# Patient Record
Sex: FEMALE | Race: WHITE | NOT HISPANIC OR LATINO | Employment: OTHER | URBAN - METROPOLITAN AREA
[De-identification: names, ages, dates, MRNs, and addresses within clinical notes are randomized per-mention and may not be internally consistent; named-entity substitution may affect disease eponyms.]

---

## 2017-08-02 RX ORDER — METFORMIN HYDROCHLORIDE 500 MG/5ML
SOLUTION ORAL
COMMUNITY
End: 2017-09-21

## 2017-08-02 RX ORDER — ESOMEPRAZOLE MAGNESIUM 40 MG/1
40 GRANULE, DELAYED RELEASE ORAL
Qty: 30 EACH | Refills: 11 | Status: SHIPPED | OUTPATIENT
Start: 2017-08-02 | End: 2017-09-21

## 2017-08-02 RX ORDER — METFORMIN HYDROCHLORIDE 500 MG/5ML
500 SOLUTION ORAL DAILY
Status: CANCELLED | OUTPATIENT
Start: 2017-08-02 | End: 2018-08-02

## 2017-08-02 RX ORDER — PANTOPRAZOLE SODIUM 40 MG/1
40 FOR SUSPENSION ORAL DAILY
Qty: 30 PACKET | Refills: 11 | Status: CANCELLED | OUTPATIENT
Start: 2017-08-02 | End: 2018-08-02

## 2017-08-02 NOTE — TELEPHONE ENCOUNTER
[facility] with concerned care reports patient has peg tube is on Metformin and Protonix and is requiring capable medication because Metformin and Protonix can not be crushed.

## 2017-08-02 NOTE — TELEPHONE ENCOUNTER
I got this message several weeks ago. I sent Rx to [facility] in [place] on 7/17 for Protonix granule packets to be given via PEG tube. Patient was notified of this on 7/19, and she was told it was OK to stay off metformin for now.

## 2017-08-02 NOTE — TELEPHONE ENCOUNTER
[facility] states Protonix does not come in a powder granule form.  Nexium 40 mg. does come in #30 packets per box.  Can you please send it to [facility]  I will need to get a PA from .

## 2017-08-02 NOTE — TELEPHONE ENCOUNTER
----- Message from Bruno Danielle sent at 8/2/2017  9:49 AM CDT -----  Contact: [facility]  G[facility]iota called in regarding the attached patient and stated they have been trying to get a refill on patient medication for 5 weeks now and have gotten no response.  Patient has had a peg tube since March and has medications that cannot be crushed.  Pharmacy/[facility] has also sent request over with no response.    1.  Metformin 500 mg (capadable for for a peg tube)  2.  Protonix 40 mg (capadable for ped tube)    [facility]'s call back number is [phone]

## 2017-08-04 ENCOUNTER — TELEPHONE (OUTPATIENT)
Dept: PRIMARY CARE CLINIC | Facility: CLINIC | Age: 67
End: 2017-08-04

## 2017-08-04 NOTE — TELEPHONE ENCOUNTER
----- Message from Chioma Merrill sent at 8/1/2017  2:49 PM CDT -----  Please call patient in regards to metformin not going through feeding tube, [phone] (home)

## 2017-08-04 NOTE — TELEPHONE ENCOUNTER
Spoke to patient.  Informed her she does not need to be taking Metformin.  Also PH denied the Nexium packets.  They state she can open the esomeprazole capsules and administer through PEG tube.  Patient states she has 3 bottles of the Esomeprazole capsules.  Left detailed voicemail for [facility] nurse Re: this matter.  Scheduled patient for appt to see Dr. Gallego on 8/24/17 @ 3:45

## 2017-08-04 NOTE — TELEPHONE ENCOUNTER
----- Message from Mireya Tate sent at 8/4/2017 12:06 PM CDT -----  Please call patient in reference to a medication question.  Call [phone].

## 2017-09-08 PROBLEM — C15.9 ESOPHAGEAL CANCER: Status: ACTIVE | Noted: 2017-09-08

## 2017-09-08 PROBLEM — G47.00 INSOMNIA: Status: ACTIVE | Noted: 2017-09-08

## 2017-09-08 PROBLEM — E11.69 TYPE 2 DIABETES MELLITUS WITH HYPERLIPIDEMIA: Status: ACTIVE | Noted: 2017-09-08

## 2017-09-08 PROBLEM — E78.5 HYPERLIPIDEMIA: Status: ACTIVE | Noted: 2017-09-08

## 2017-09-08 PROBLEM — E78.5 TYPE 2 DIABETES MELLITUS WITH HYPERLIPIDEMIA: Status: ACTIVE | Noted: 2017-09-08

## 2017-09-08 PROBLEM — I10 ESSENTIAL HYPERTENSION, BENIGN: Status: ACTIVE | Noted: 2017-09-08

## 2017-09-08 PROBLEM — K21.9 GERD (GASTROESOPHAGEAL REFLUX DISEASE): Status: ACTIVE | Noted: 2017-09-08

## 2017-09-21 ENCOUNTER — OFFICE VISIT (OUTPATIENT)
Dept: HEMATOLOGY/ONCOLOGY | Facility: CLINIC | Age: 67
End: 2017-09-21
Payer: MEDICARE

## 2017-09-21 VITALS
BODY MASS INDEX: 21.64 KG/M2 | RESPIRATION RATE: 18 BRPM | SYSTOLIC BLOOD PRESSURE: 108 MMHG | HEART RATE: 73 BPM | HEIGHT: 57 IN | TEMPERATURE: 99 F | DIASTOLIC BLOOD PRESSURE: 65 MMHG | WEIGHT: 100.31 LBS

## 2017-09-21 DIAGNOSIS — C78.00 MALIGNANT NEOPLASM METASTATIC TO LUNG, UNSPECIFIED LATERALITY: ICD-10-CM

## 2017-09-21 DIAGNOSIS — C15.9 MALIGNANT NEOPLASM OF ESOPHAGUS, UNSPECIFIED LOCATION: Primary | ICD-10-CM

## 2017-09-21 PROCEDURE — 1126F AMNT PAIN NOTED NONE PRSNT: CPT | Mod: ,,, | Performed by: INTERNAL MEDICINE

## 2017-09-21 PROCEDURE — 3008F BODY MASS INDEX DOCD: CPT | Mod: ,,, | Performed by: INTERNAL MEDICINE

## 2017-09-21 PROCEDURE — 99204 OFFICE O/P NEW MOD 45 MIN: CPT | Mod: ,,, | Performed by: INTERNAL MEDICINE

## 2017-09-21 PROCEDURE — 1159F MED LIST DOCD IN RCRD: CPT | Mod: ,,, | Performed by: INTERNAL MEDICINE

## 2017-09-21 RX ORDER — EZETIMIBE 10 MG/1
10 TABLET ORAL DAILY
COMMUNITY
End: 2018-01-10 | Stop reason: SDUPTHER

## 2017-09-21 RX ORDER — LISINOPRIL AND HYDROCHLOROTHIAZIDE 20; 25 MG/1; MG/1
1 TABLET ORAL DAILY
COMMUNITY
End: 2017-10-23 | Stop reason: SDUPTHER

## 2017-09-21 RX ORDER — HYDROCODONE BITARTRATE AND ACETAMINOPHEN 7.5; 325 MG/15ML; MG/15ML
10 SOLUTION ORAL EVERY 4 HOURS PRN
COMMUNITY
End: 2017-09-21 | Stop reason: SDUPTHER

## 2017-09-21 RX ORDER — OMEPRAZOLE 40 MG/1
40 CAPSULE, DELAYED RELEASE ORAL DAILY
COMMUNITY
End: 2018-08-27

## 2017-09-21 RX ORDER — HYDROCODONE BITARTRATE AND ACETAMINOPHEN 7.5; 325 MG/15ML; MG/15ML
10 SOLUTION ORAL EVERY 4 HOURS PRN
Qty: 473 ML | Refills: 0 | Status: SHIPPED | OUTPATIENT
Start: 2017-09-21 | End: 2018-02-19

## 2017-09-21 NOTE — PROGRESS NOTES
[facility] Hematolgy/Oncology  History & Physical    Subjective:      Patient ID:   NAME: Arina Mcginnis : 1950     67 y.o. female    Referring Doc: Galo Winters  Other Physicians: Patt Gallego Veith        Chief Complaint:   Esophageal cancer    HPI:  67 y.o. female with diagnosis of advanced metastatic esophageal cnacer who has been referred by Dr Galo Winters for evaluation by medical hematology/oncology. She was originally diagnosed in 2017 and was previously seen by Dr Phillip Lazo with oncology at [facility]. She had monotherapy with radiation from  through May 23rd. She had a surveillance EGD on  with repeat esophageal biopsy showing residual presence of invasive squamous cell carcinoma. She is here with his sister. She saw Dr Lazo about 3 weeks ago. She has portacath in place and has peg tube. She is unable to swallow. She has lost some weight 20-25 lbs but is now back up to 100lbs. She denies any CP, SOB, HA's or N/V. She is a poor historian. She has some upper mid-back pain.               ROS:   GEN: normal without any fever, night sweats; positive weigh loss  HEENT: normal with no HA's, sore throat, stiff neck, changes in vision  CV: normal with no CP, SOB, PND, ARGUETA or orthopnea  PULM: normal with no SOB, cough, hemoptysis, sputum or pleuritic pain  GI: unable to swallow; peg tube  : normal with no hematuria, dysuria  BREAST: normal with no mass, discharge, pain  SKIN: normal with no rash, erythema, bruising, or swelling       Past Medical/Surgical History:      Allergies:  Review of patient's allergies indicates:  Allergies not on file    Social/Family History:  Social History     Social History    Marital status: Single     Spouse name: N/A    Number of children: N/A    Years of education: N/A     Occupational History    Not on file.     Social History Main Topics    Smoking status: Former Smoker     Packs/day: 1.00     Years: 48.00     Types: Cigarettes     Quit date:  "07/2017    Smokeless tobacco: Never Used    Alcohol use Not on file    Drug use: Unknown    Sexual activity: Not on file     Other Topics Concern    Not on file     Social History Narrative    No narrative on file     No family history on file.      Medications:    Current Outpatient Prescriptions:     ezetimibe (ZETIA) 10 mg tablet, Take 10 mg by mouth once daily., Disp: , Rfl:     hydrocodone-acetaminophen (HYCET) solution 7.5-325 mg/15mL, Take 10 mLs by mouth every 4 (four) hours as needed for Pain., Disp: , Rfl:     lisinopril-hydrochlorothiazide (PRINZIDE,ZESTORETIC) 20-25 mg Tab, Take 1 tablet by mouth once daily., Disp: , Rfl:     omeprazole (PRILOSEC) 40 MG capsule, Take 40 mg by mouth once daily., Disp: , Rfl:       Pathology:  3/29/2017  Esophageal Biopsy: upper esophagus with invasive moderately differentiated squamous cell carcinoma  PDL-1 (keytruda) positive at 5% (weak)  HER2Nu +2 equivocal  Stage IV  T3 N1 M1    3/27/2017 cervical LN biopsy - positive for metastatic squamous cell carcinoma    Objective:   Vitals:  Blood pressure 108/65, pulse 73, temperature 98.5 °F (36.9 °C), temperature source Oral, resp. rate 18, height 4' 9" (1.448 m), weight 45.5 kg (100 lb 4.8 oz).    Physical Examination:   GEN: no apparent distress, comfortable; AAOx3; looks older than her age  HEAD: atraumatic and normocephalic  EYES: no pallor, no icterus, PERRLA  ENT: OMM, no pharyngeal erythema, external ears WNL; no nasal discharge; no thrush  NECK: no masses, thyroid normal, trachea midline, no LAD/LN's, supple  CV: RRR with no murmur; normal pulse; normal S1 and S2; no pedal edema  CHEST: Normal respiratory effort; CTAB; normal breath sounds; no wheeze or crackles; portacath on left CW  ABDOM: nontender and nondistended; soft; normal bowel sounds; no rebound/guarding; peg tube  MUSC/Skeletal: ROM normal; no crepitus; joints normal; no deformities or arthropathy  EXTREM: no clubbing, cyanosis, inflammation or " swelling  SKIN: no rashes, lesions, ulcers, petechiae or subcutaneous nodules  : no ruiz  NEURO: grossly intact; motor/sensory WNL; AAOx3; no tremors  PSYCH: normal mood, affect and behavior  LYMPH: normal cervical, supraclavicular, axillary and groin LN's      Labs:     Need latest labs from [facility]    Radiology/Diagnostic Studies:    CT Chest 7/14/2017 at [facility] - decreased size of the esophageal mass and resolution of the right paraesophageal LN, but worsening of the pulmonary mets (per report)      All lab results and imaging results have been reviewed and discussed with the patient    Assessment:   (1) 67 y.o. female with diagnosis of advanced metastatic esophageal cancer who has been referred by Dr Galo Winters for evaluation by medical hematology/oncology.  - originally diagnosed in March 2017  - squamous cell variety per pathology report dated 3/29/2017  - positive right supraclavicular LN  - she had port placement on   - she was seen in past by Dr Phillip Lazo with oncology at [facility] and Dr Jonny Carrion with Rad/onc at [facility]  - she completed XRT in May 23rd 2017    - she had repeat esophageal biopsy on 9/1/2017 which showed residual squamous cell carcinoma    (2) HTN and Hypercholesterolemia    (3) DM II    (4) GERD    (5) Hepatitis C history with cirrhosis - she is followed by Dr Beltre; s/p successful Harvoni therapy    (6) Arthrits issues    (7) former smoker - she quit one month ago    I discussed the poor prognosis of having this type of cancer and the incurability of her condition. She is interested in pursuing some form of therapy but the limiting factor will be the liver function with her diagnosis of cirrhosis and chemotherapy toxicity.     I reviewed and discussed the pathology report(s) in depth with the patient and went over the patient's individual diagnosis based on the information that was currently available. I discussed the TNM staging process with regard to the patient's particular cancer  type, and the calculated stage based on the currently available TNM data. I discussed the available prognostic data with regard to the current staging information and how it relates to the prognosis of their particular neoplastic process.              Plan:         PLAN:  1. Schedule PET scan  2. Need all records from Dr Lazo  3. Need latest labs from [facility]  4. Fax note to Patt Gallego Contreary  RTC in  1 week      I have explained and the patient understands all of  the current recommendation(s). I have answered all of their questions to the best of my ability and to their complete satisfaction.             Thank you for allowing me to participate in this patient's care. Please call with any questions or concerns.    Electronically signed Alfred Petit MD

## 2017-09-25 LAB — GLUCOSE SERPL-MCNC: 124 MG/DL (ref 70–99)

## 2017-09-29 ENCOUNTER — OFFICE VISIT (OUTPATIENT)
Dept: HEMATOLOGY/ONCOLOGY | Facility: CLINIC | Age: 67
End: 2017-09-29
Payer: MEDICARE

## 2017-09-29 VITALS
HEART RATE: 79 BPM | RESPIRATION RATE: 18 BRPM | WEIGHT: 102.69 LBS | HEIGHT: 55 IN | SYSTOLIC BLOOD PRESSURE: 95 MMHG | DIASTOLIC BLOOD PRESSURE: 59 MMHG | BODY MASS INDEX: 23.77 KG/M2 | TEMPERATURE: 99 F

## 2017-09-29 DIAGNOSIS — C15.9 MALIGNANT NEOPLASM OF ESOPHAGUS, UNSPECIFIED LOCATION: ICD-10-CM

## 2017-09-29 DIAGNOSIS — C78.00 MALIGNANT NEOPLASM METASTATIC TO LUNG, UNSPECIFIED LATERALITY: Primary | ICD-10-CM

## 2017-09-29 LAB
ALBUMIN SERPL-MCNC: 3.5 G/DL (ref 3.1–4.7)
ALP SERPL-CCNC: 145 IU/L (ref 40–104)
ALT (SGPT): 15 IU/L (ref 3–33)
AST SERPL-CCNC: 22 IU/L (ref 10–40)
BASOPHILS NFR BLD: 0 K/UL (ref 0–0.2)
BASOPHILS NFR BLD: 0.4 %
BILIRUB SERPL-MCNC: 0.3 MG/DL (ref 0.3–1)
BUN SERPL-MCNC: 33 MG/DL (ref 8–20)
CALCIUM SERPL-MCNC: 9.1 MG/DL (ref 7.7–10.4)
CHLORIDE: 95 MMOL/L (ref 98–110)
CO2 SERPL-SCNC: 27 MMOL/L (ref 22.8–31.6)
CREATININE: 0.59 MG/DL (ref 0.6–1.4)
EOSINOPHIL NFR BLD: 0.2 K/UL (ref 0–0.7)
EOSINOPHIL NFR BLD: 2.1 %
ERYTHROCYTE [DISTWIDTH] IN BLOOD BY AUTOMATED COUNT: 14.2 % (ref 11.7–14.9)
GLUCOSE: 129 MG/DL (ref 70–99)
GRAN #: 5.7 K/UL (ref 1.4–6.5)
GRAN%: 73.3 %
HCT VFR BLD AUTO: 24.6 % (ref 36–48)
HGB BLD-MCNC: 8.3 G/DL (ref 12–15)
IMMATURE GRANS (ABS): 0 K/UL (ref 0–1)
IMMATURE GRANULOCYTES: 0.3 %
LYMPH #: 0.7 K/UL (ref 1.2–3.4)
LYMPH%: 9.3 %
MCH RBC QN AUTO: 33.7 PG (ref 25–35)
MCHC RBC AUTO-ENTMCNC: 33.7 G/DL (ref 31–36)
MCV RBC AUTO: 100 FL (ref 79–98)
MONO #: 1.1 K/UL (ref 0.1–0.6)
MONO%: 14.6 %
NUCLEATED RBCS: 0 %
PLATELET # BLD AUTO: 151 K/UL (ref 140–440)
PMV BLD AUTO: 10.7 FL (ref 8.8–12.7)
POTASSIUM SERPL-SCNC: 4.4 MMOL/L (ref 3.5–5)
PROT SERPL-MCNC: 7.8 G/DL (ref 6–8.2)
RBC # BLD AUTO: 2.46 M/UL (ref 3.5–5.5)
SODIUM: 130 MMOL/L (ref 134–144)
WBC # BLD AUTO: 7.7 K/UL (ref 5–10)

## 2017-09-29 PROCEDURE — 1126F AMNT PAIN NOTED NONE PRSNT: CPT | Mod: ,,, | Performed by: INTERNAL MEDICINE

## 2017-09-29 PROCEDURE — 3008F BODY MASS INDEX DOCD: CPT | Mod: ,,, | Performed by: INTERNAL MEDICINE

## 2017-09-29 PROCEDURE — 99214 OFFICE O/P EST MOD 30 MIN: CPT | Mod: ,,, | Performed by: INTERNAL MEDICINE

## 2017-09-29 PROCEDURE — 1159F MED LIST DOCD IN RCRD: CPT | Mod: ,,, | Performed by: INTERNAL MEDICINE

## 2017-09-29 RX ORDER — ESOMEPRAZOLE MAGNESIUM 40 MG/1
CAPSULE, DELAYED RELEASE ORAL
Refills: 1 | COMMUNITY
Start: 2017-08-11 | End: 2018-01-03 | Stop reason: SDUPTHER

## 2017-09-29 RX ORDER — MUPIROCIN 20 MG/G
OINTMENT TOPICAL
Refills: 0 | COMMUNITY
Start: 2017-09-06 | End: 2018-08-27

## 2017-09-29 RX ORDER — SULFAMETHOXAZOLE AND TRIMETHOPRIM 200; 40 MG/5ML; MG/5ML
SUSPENSION ORAL
Refills: 0 | COMMUNITY
Start: 2017-09-06 | End: 2018-02-19

## 2017-09-29 NOTE — PROGRESS NOTES
[facility] Hematology/Oncology  PROGRESS NOTE      Subjective:       Patient ID:   NAME: Arina Mcginnis : 1950     67 y.o. female    Referring Doc: Paul Gallego MD  Other Physicians: Clifton Winters Beary    Chief Complaint:  Follow-up and Results (waiting on the pet)      History of Present Illness:     Patient returns today for a regularly scheduled follow-up visit.  The patient had her PET scan the other day but the report is not yet available pending comparison to outside films. She is here with her sister. I discussed with her about referral to Dr Loredo for palliative esophageal stent and she is agreeable. She does have a feeding tube in place.             ROS:   GEN: normal without any fever, night sweats or weight loss  HEENT: normal with no HA's, sore throat, stiff neck, changes in vision  CV: normal with no CP, SOB, PND, ARGUETA or orthopnea  PULM: normal with no SOB, cough, hemoptysis, sputum or pleuritic pain  GI: normal with no abdominal pain, nausea, vomiting, constipation, diarrhea, melanotic stools, BRBPR, or hematemesis  : normal with no hematuria, dysuria  BREAST: normal with no mass, discharge, pain  SKIN: normal with no rash, erythema, bruising, or swelling    Allergies:  Review of patient's allergies indicates:  No Known Allergies    Medications:    Current Outpatient Prescriptions:     ezetimibe (ZETIA) 10 mg tablet, Take 10 mg by mouth once daily., Disp: , Rfl:     hydrocodone-acetaminophen (HYCET) solution 7.5-325 mg/15mL, Take 10 mLs by mouth every 4 (four) hours as needed for Pain., Disp: 473 mL, Rfl: 0    lisinopril-hydrochlorothiazide (PRINZIDE,ZESTORETIC) 20-25 mg Tab, Take 1 tablet by mouth once daily., Disp: , Rfl:     omeprazole (PRILOSEC) 40 MG capsule, Take 40 mg by mouth once daily., Disp: , Rfl:     esomeprazole (NEXIUM) 40 MG capsule, TK 1 C PO QD, Disp: , Rfl: 1    mupirocin (BACTROBAN) 2 % ointment, TERRENCE TOPICALLY TID FOR 7 DAYS, Disp: , Rfl: 0     "sulfamethoxazole-trimethoprim 200-40 mg/5 ml (BACTRIM,SEPTRA) 200-40 mg/5 mL Susp, TK 7.5 ML PER ENTERAL TUBE BID FOR 10 DAYS, Disp: , Rfl: 0    PMHx/PSHx Updates:  See patient's last visit with me on 9/21/2017.  See H&P on 9/21/2017        Pathology:  3/29/2017 on chart:    Esophageal Biopsy: upper esophagus with invasive moderately differentiated squamous cell carcinoma  PDL-1 (keytruda) positive at 5% (weak)  HER2Nu +2 equivocal  Stage IV  T3 N1 M1     3/27/2017 cervical LN biopsy - positive for metastatic squamous cell carcinoma      Objective:     Vitals:  Blood pressure (!) 95/59, pulse 79, temperature 98.5 °F (36.9 °C), resp. rate 18, height 4' 2" (1.27 m), weight 46.6 kg (102 lb 11.2 oz).    Physical Examination:   GEN: no apparent distress, comfortable; AAOx3  HEAD: atraumatic and normocephalic  EYES: no pallor, no icterus, PERRLA  ENT: OMM, no pharyngeal erythema, external ears WNL; no nasal discharge; no thrush  NECK: no masses, thyroid normal, trachea midline, no LAD/LN's, supple  CV: RRR with no murmur; normal pulse; normal S1 and S2; no pedal edema  CHEST: Normal respiratory effort; CTAB; normal breath sounds; no wheeze or crackles  ABDOM: nontender and nondistended; soft; normal bowel sounds; no rebound/guarding; peg tube in place  MUSC/Skeletal: ROM normal; no crepitus; joints normal; no deformities or arthropathy  EXTREM: no clubbing, cyanosis, inflammation or swelling  SKIN: no rashes, lesions, ulcers, petechiae or subcutaneous nodules  : no ruiz  NEURO: grossly intact; motor/sensory WNL; AAOx3; no tremors  PSYCH: normal mood, affect and behavior  LYMPH: normal cervical, supraclavicular, axillary and groin LN's            Labs:     None       Radiology/Diagnostic Studies:    No results found.    I have reviewed all available lab results and radiology reports.    Assessment/Plan:   (1) 67 y.o. female with diagnosis of advanced metastatic esophageal cancer who has been referred by Dr Rosenthal " Singh for evaluation by medical hematology/oncology.  - originally diagnosed in March 2017  - squamous cell variety per pathology report dated 3/29/2017  - positive right supraclavicular LN  - she had port placement on   - she was seen in past by Dr Phillip Lazo with oncology at [facility] and Dr Jonny Carrion with Rad/onc at [facility]  - she completed XRT in May 23rd 2017     - she had repeat esophageal biopsy on 9/1/2017 which showed residual squamous cell carcinoma     (2) HTN and Hypercholesterolemia     (3) DM II     (4) GERD     (5) Hepatitis C history with cirrhosis - she is followed by Dr Beltre; s/p successful Harvoni therapy     (6) Arthrits issues     (7) former smoker - she quit one month ago     I discussed the poor prognosis of having this type of cancer and the incurability of her condition. She is interested in pursuing some form of therapy but the limiting factor will be the liver function with her diagnosis of cirrhosis and chemotherapy toxicity.      I reviewed and discussed the pathology report(s) in depth with the patient and went over the patient's individual diagnosis based on the information that was currently available. I discussed the TNM staging process with regard to the patient's particular cancer type, and the calculated stage based on the currently available TNM data. I discussed the available prognostic data with regard to the current staging information and how it relates to the prognosis of their particular neoplastic process.            PLAN:  1. Check labs today  2. Patient to attempt to get records from dr Gongora for me  3. Await PET scan report  4. Refer to Dr Loredo with GI  RTC in 1-2 weeks  Fax note to Paul Gallego MD, Clifton Winters Beary    Discussion:     I have explained all of the above in detail and the patient understands all of the current recommendation(s). I have answered all of their questions to the best of my ability and to their complete satisfaction.   The  patient is to continue with the current management plan.            Electronically signed by Alfred Petit MD

## 2017-09-29 NOTE — LETTER
September 29, 2017      Paul Gallego MD  [address]  [address]  [place] [address]           [facility]  [address]  [address]  [facility] [place] [place] [address]  Phone: [phone]  Fax: [phone]          Patient: Arina Mcginnis   MR Number: [id number]   YOB: 1950   Date of Visit: 9/29/2017       Dear Dr. Paul Gallego:    Thank you for referring Arina Mcginnis to me for evaluation. Attached you will find relevant portions of my assessment and plan of care.    If you have questions, please do not hesitate to call me. I look forward to following Arina Mcginnis along with you.    Sincerely,    Alfred Petit MD    Enclosure  CC:  No Recipients    If you would like to receive this communication electronically, please contact [email or web address] or [phone] to request more information on [employer] Link access.    For providers and/or their staff who would like to refer a patient to [facility], please contact us through our one-stop-shop provider referral line, [facility], at [phone].    If you feel you have received this communication in error or would no longer like to receive these types of communications, please e-mail [email or web address]

## 2017-10-02 ENCOUNTER — TELEPHONE (OUTPATIENT)
Dept: HEMATOLOGY/ONCOLOGY | Facility: CLINIC | Age: 67
End: 2017-10-02

## 2017-10-02 NOTE — TELEPHONE ENCOUNTER
Called patient. I tried to find out how much of the medication Hycet, liquid Norco she has been using. She could not give me an amt. She says she really has not been measuring the medication as ordered. I offered her a pain patch but she says that they do not work.She is requesting more of the liquid medication.  I told her I will discuss this with  and get back to her.       Spoke again to  about patient's request for more pain medication. I explained to her that  says she should see her PCP doc about the medicine. She was not happy to hear this. She again said the pain patch does not work. She sees a new doctor

## 2017-10-02 NOTE — TELEPHONE ENCOUNTER
----- Message from Bia Dickinson sent at 10/2/2017 10:34 AM CDT -----  Contact: giovana  Patient stating that the pain medication that was refilled on Friday was not enough and did not help her pain. She's asking if we could give her something else? If Dr Petit could prescribe something that could be crushed she would accept that. Please advise   # [phone]

## 2017-10-03 ENCOUNTER — TELEPHONE (OUTPATIENT)
Dept: HEMATOLOGY/ONCOLOGY | Facility: CLINIC | Age: 67
End: 2017-10-03

## 2017-10-03 NOTE — TELEPHONE ENCOUNTER
----- Message from Alfred Petit MD sent at 10/2/2017  6:54 PM CDT -----  Make sure she has RTC to go over PET

## 2017-10-23 RX ORDER — LISINOPRIL AND HYDROCHLOROTHIAZIDE 20; 25 MG/1; MG/1
1 TABLET ORAL DAILY
Qty: 90 TABLET | Refills: 0 | Status: SHIPPED | OUTPATIENT
Start: 2017-10-23 | End: 2018-02-19

## 2017-10-23 RX ORDER — ESZOPICLONE 3 MG/1
3 TABLET, FILM COATED ORAL NIGHTLY
Qty: 90 TABLET | Refills: 0 | Status: SHIPPED | OUTPATIENT
Start: 2017-10-23 | End: 2017-11-22

## 2017-10-23 RX ORDER — ESZOPICLONE 3 MG/1
TABLET, FILM COATED ORAL
Qty: 90 TABLET | Refills: 1 | Status: SHIPPED | OUTPATIENT
Start: 2017-10-23 | End: 2018-02-19 | Stop reason: CLARIF

## 2017-10-23 NOTE — RAD
RADIOGRAPH CHEST 1 VIEW:

 

Date:  10/23/2017

Time:  10:48 a.m.

 

HISTORY:

A 67-year-old female with chest tightness.

 

COMPARISON:

09/07/2017

 

FINDINGS:

Again noted are the sternotomy wires and the surgical clips overlying the left cardiac shadow.  The 
cardiomediastinal silhouette is normal, with no cardiomegaly and no pulmonary vascular engorgement o
r pulmonary edema.  No pneumothorax.  The lateral costophrenic angles are sharp.  The hilar shadows 
are unremarkable.  The previously demonstrated air space density at the right lung base has regresse
d.  Currently, there is mild residual infiltrate like density, and a small focal stellate component,
 which probably represents pulmonary scar tissue.

 

IMPRESSION:

1.  Small stellate density at the right lower lung zone, consistent with pulmonary scar.

 

2.  This is surrounded by a mild infiltrate, which has improved compared to 09/07/2017.  It is uncer
tain whether this represents residua of improving previous infiltrate or whether this represents a r
ecurrent or acute infiltrate.  The former is slightly favored (unless the patient has a cough and fe
bladimir).

 

3.  Short interval follow-up chest radiograph is recommended in several days.

 

4.  Status post coronary artery bypass graft surgery is evidence for coronary atherosclerotic diseas
e.

 

JN []

 

POS: [facility]

## 2017-10-23 NOTE — TELEPHONE ENCOUNTER
----- Message from Chhaya Weaver sent at 10/23/2017  8:46 AM CDT -----  Contact: patient  Patient, Arina Mcginnis, [phone], is calling regarding a refill on Rx lisinopril-hydrochlorothiazide (PRINZIDE,ZESTORETIC) 20-25 mg Tab, AND esxopiclone 3mg (not on chart).  Please advise.  Thanks!    [facility] [address] [place] [address] [place] - [address] AT [facility] [address]  [address] [address]  Phone: [phone] Fax: [phone]

## 2017-11-13 DIAGNOSIS — C15.9 MALIGNANT NEOPLASM OF ESOPHAGUS, UNSPECIFIED LOCATION: ICD-10-CM

## 2017-11-13 RX ORDER — HYDROCODONE BITARTRATE AND ACETAMINOPHEN 7.5; 325 MG/15ML; MG/15ML
10 SOLUTION ORAL EVERY 4 HOURS PRN
Qty: 473 ML | Refills: 0 | OUTPATIENT
Start: 2017-11-13

## 2017-11-13 NOTE — TELEPHONE ENCOUNTER
----- Message from Patricia Bojorquez sent at 11/13/2017 11:40 AM CST -----  Patient needs a refill on hydrocodone-acetaminophen (HYCET) solution 7.5-325 mg/15mL. She states she hurts. Please remit to:    [facility] - [place] [address]  [address] [address]  Phone: [phone] Fax: [phone]    Please call patient with questions or when completed at [phone]. She is also asking if doctor can up the dosage on this medication.

## 2017-11-16 ENCOUNTER — TELEPHONE (OUTPATIENT)
Dept: HEMATOLOGY/ONCOLOGY | Facility: CLINIC | Age: 67
End: 2017-11-16

## 2018-01-03 RX ORDER — ESOMEPRAZOLE MAGNESIUM 40 MG/1
CAPSULE, DELAYED RELEASE ORAL
Qty: 90 CAPSULE | Refills: 1 | Status: SHIPPED | OUTPATIENT
Start: 2018-01-03 | End: 2018-04-16 | Stop reason: SDUPTHER

## 2018-01-03 NOTE — TELEPHONE ENCOUNTER
----- Message from Dot Damico sent at 1/3/2018 11:53 AM CST -----  Contact: Patient  Arina, patient [phone], Calling to get  Refill on Rx esomeprazole (NEXIUM) 40 MG capsule. Stated pharmacy has also requested a refill. Please advise. Thanks. Patient is out of medication.  [facility] [id number] -  [facility] [address] [place] [address]  Phone: [phone] Fax: [phone]

## 2018-01-10 RX ORDER — EZETIMIBE 10 MG/1
10 TABLET ORAL DAILY
Qty: 30 TABLET | Refills: 2 | Status: SHIPPED | OUTPATIENT
Start: 2018-01-10 | End: 2018-02-11 | Stop reason: SDUPTHER

## 2018-01-10 NOTE — TELEPHONE ENCOUNTER
----- Message from Valarie Branch sent at 1/10/2018  2:17 PM CST -----  Contact: Patient  Arina, patient [phone], Calling for refill on Rx ezetimibe (ZETIA) 10 mg tablet. Please advise. Thanks.      [facility] [id number] - [place] - [address] AT [facility] [address]  [address] [address]  Phone: [phone] Fax: [phone]

## 2018-02-12 RX ORDER — EZETIMIBE 10 MG/1
TABLET ORAL
Qty: 90 TABLET | Refills: 0 | Status: SHIPPED | OUTPATIENT
Start: 2018-02-12 | End: 2018-09-21 | Stop reason: SDUPTHER

## 2018-02-19 ENCOUNTER — OFFICE VISIT (OUTPATIENT)
Dept: PRIMARY CARE CLINIC | Facility: CLINIC | Age: 68
End: 2018-02-19
Payer: MEDICARE

## 2018-02-19 VITALS
BODY MASS INDEX: 20.97 KG/M2 | SYSTOLIC BLOOD PRESSURE: 118 MMHG | DIASTOLIC BLOOD PRESSURE: 75 MMHG | WEIGHT: 97.19 LBS | OXYGEN SATURATION: 96 % | HEART RATE: 93 BPM | TEMPERATURE: 99 F | HEIGHT: 57 IN | RESPIRATION RATE: 16 BRPM

## 2018-02-19 DIAGNOSIS — E78.5 TYPE 2 DIABETES MELLITUS WITH HYPERLIPIDEMIA: ICD-10-CM

## 2018-02-19 DIAGNOSIS — E78.5 HYPERLIPIDEMIA, UNSPECIFIED HYPERLIPIDEMIA TYPE: ICD-10-CM

## 2018-02-19 DIAGNOSIS — C15.9 MALIGNANT NEOPLASM OF ESOPHAGUS, UNSPECIFIED LOCATION: ICD-10-CM

## 2018-02-19 DIAGNOSIS — E11.69 TYPE 2 DIABETES MELLITUS WITH HYPERLIPIDEMIA: ICD-10-CM

## 2018-02-19 DIAGNOSIS — C78.00 MALIGNANT NEOPLASM METASTATIC TO LUNG, UNSPECIFIED LATERALITY: ICD-10-CM

## 2018-02-19 DIAGNOSIS — Z93.1 S/P PERCUTANEOUS ENDOSCOPIC GASTROSTOMY (PEG) TUBE PLACEMENT: ICD-10-CM

## 2018-02-19 DIAGNOSIS — F51.01 PRIMARY INSOMNIA: ICD-10-CM

## 2018-02-19 DIAGNOSIS — I10 ESSENTIAL HYPERTENSION, BENIGN: Primary | ICD-10-CM

## 2018-02-19 PROCEDURE — 99499 UNLISTED E&M SERVICE: CPT | Mod: S$GLB,,, | Performed by: FAMILY MEDICINE

## 2018-02-19 PROCEDURE — 3008F BODY MASS INDEX DOCD: CPT | Mod: S$GLB,,, | Performed by: FAMILY MEDICINE

## 2018-02-19 PROCEDURE — 1159F MED LIST DOCD IN RCRD: CPT | Mod: S$GLB,,, | Performed by: FAMILY MEDICINE

## 2018-02-19 PROCEDURE — 99999 PR PBB SHADOW E&M-EST. PATIENT-LVL III: CPT | Mod: PBBFAC,,, | Performed by: FAMILY MEDICINE

## 2018-02-19 PROCEDURE — 99214 OFFICE O/P EST MOD 30 MIN: CPT | Mod: S$GLB,,, | Performed by: FAMILY MEDICINE

## 2018-02-19 RX ORDER — OXYCODONE HCL 5 MG/5 ML
2.5 SOLUTION, ORAL ORAL EVERY 4 HOURS
COMMUNITY
Start: 2018-02-16

## 2018-02-19 RX ORDER — TRAZODONE HYDROCHLORIDE 50 MG/1
TABLET ORAL
Qty: 60 TABLET | Refills: 5 | Status: SHIPPED | OUTPATIENT
Start: 2018-02-19 | End: 2018-08-22 | Stop reason: SDUPTHER

## 2018-02-19 RX ORDER — FENTANYL 25 UG/1
1 PATCH TRANSDERMAL
COMMUNITY
Start: 2018-02-15 | End: 2018-10-03 | Stop reason: ALTCHOICE

## 2018-02-19 RX ORDER — LACTULOSE 10 G/15ML
SOLUTION ORAL; RECTAL
COMMUNITY
Start: 2018-02-16 | End: 2018-08-27

## 2018-02-19 NOTE — PROGRESS NOTES
"Subjective:       Patient ID: Arina Mcginnis is a 67 y.o. female.    Chief Complaint: Medication Refill (maybe Lunesta?) and Medication Problem (Lisinopril HCTZ. Told to stop medication because BP too low. )    Blood pressure has been consistently low at her oncology appointments, so she was advised to stop taking her blood pressure medication 2-3 weeks ago.  Since that time, blood pressure has been normal, and she says she is feeling okay  Diabetes-blood sugar high earlier this month, says she was unable to have her PET scan due to hyperglycemia.  Overdue for labs  Has esophageal carcinoma with lung metastases.  Has had esophageal dilatations, only able to swallow small sips of liquids.  Takes all of her medications and enteral nutrition via PEG tube.      Review of Systems   Constitutional: Positive for fatigue. Negative for chills and fever.   HENT: Positive for trouble swallowing. Negative for congestion.    Eyes: Negative for visual disturbance.   Respiratory: Negative for cough and shortness of breath.    Cardiovascular: Negative for chest pain.   Gastrointestinal: Negative for abdominal pain, nausea and vomiting.   Genitourinary: Negative for difficulty urinating.   Musculoskeletal: Negative for arthralgias.   Skin: Negative for rash.   Neurological: Negative for dizziness.   Psychiatric/Behavioral: Positive for sleep disturbance.       Objective:      Vitals:    02/19/18 1436   BP: 118/75   BP Location: Right arm   Patient Position: Sitting   BP Method: Small (Automatic)   Pulse: 93   Resp: 16   Temp: 98.5 °F (36.9 °C)   TempSrc: Oral   SpO2: 96%   Weight: 44.1 kg (97 lb 3.2 oz)   Height: 4' 9" (1.448 m)     Physical Exam   Constitutional: She is oriented to person, place, and time. She appears well-developed and well-nourished.   HENT:   Head: Normocephalic and atraumatic.   Eyes: EOM are normal.   Neck: Neck supple. No JVD present.   Cardiovascular: Normal rate, regular rhythm and normal heart sounds.  "   Pulmonary/Chest: Effort normal and breath sounds normal.   Port-A-Cath to left upper chest wall   Abdominal: Soft. Bowel sounds are normal. There is no tenderness.   PEG tube to left upper quadrant   Musculoskeletal: She exhibits no edema.   Neurological: She is alert and oriented to person, place, and time.   Skin: Skin is warm and dry.   Psychiatric: She has a normal mood and affect. Her behavior is normal.   Nursing note and vitals reviewed.      Assessment:       1. Essential hypertension, benign Stable   2. S/P percutaneous endoscopic gastrostomy (PEG) tube placement    3. Malignant neoplasm of esophagus, unspecified location    4. Malignant neoplasm metastatic to lung, unspecified laterality    5. Primary insomnia    6. Type 2 diabetes mellitus with hyperlipidemia    7. Hyperlipidemia, unspecified hyperlipidemia type        Plan:       Essential hypertension, benign  Comments:  BP good off meds, continue to monitor for now  Orders:  -     CBC auto differential; Future; Expected date: 02/19/2018    S/P percutaneous endoscopic gastrostomy (PEG) tube placement  Comments:  Continue current tube feeds    Malignant neoplasm of esophagus, unspecified location  Comments:  Management as per [facility]    Malignant neoplasm metastatic to lung, unspecified laterality  Comments:  Management as per [facility]    Primary insomnia  Comments:  Switch to covered agent  Orders:  -     traZODone (DESYREL) 50 MG tablet; 1-2 tablets at bedtime as needed for insomnia  Dispense: 60 tablet; Refill: 5    Type 2 diabetes mellitus with hyperlipidemia  Comments:  Due for labs  Orders:  -     Hemoglobin A1c; Future; Expected date: 02/19/2018  -     Microalbumin/creatinine urine ratio; Future; Expected date: 02/19/2018    Hyperlipidemia, unspecified hyperlipidemia type  -     Comprehensive metabolic panel; Future; Expected date: 02/19/2018  -     Lipid panel; Future; Expected date: 02/19/2018      Medication List with Changes/Refills   New Medications     TRAZODONE (DESYREL) 50 MG TABLET    1-2 tablets at bedtime as needed for insomnia   Current Medications    ESOMEPRAZOLE (NEXIUM) 40 MG CAPSULE    TK 1 C PO QD    EZETIMIBE (ZETIA) 10 MG TABLET    TAKE 1 TABLET BY MOUTH EVERY DAY    FENTANYL (DURAGESIC) 25 MCG/HR        GENERLAC 10 GRAM/15 ML SOLUTION        MUPIROCIN (BACTROBAN) 2 % OINTMENT    TERRENCE TOPICALLY TID FOR 7 DAYS    OMEPRAZOLE (PRILOSEC) 40 MG CAPSULE    Take 40 mg by mouth once daily.    OXYCODONE (ROXICODONE) 5 MG/5 ML SOLN       Discontinued Medications    ESZOPICLONE 3 MG TAB    TAKE 1 TABLET BY MOUTH EVERY DAY AT BEDTIME    HYDROCODONE-ACETAMINOPHEN (HYCET) SOLUTION 7.5-325 MG/15ML    Take 10 mLs by mouth every 4 (four) hours as needed for Pain.    LISINOPRIL-HYDROCHLOROTHIAZIDE (PRINZIDE,ZESTORETIC) 20-25 MG TAB    Take 1 tablet by mouth once daily.    SULFAMETHOXAZOLE-TRIMETHOPRIM 200-40 MG/5 ML (BACTRIM,SEPTRA) 200-40 MG/5 ML SUSP    TK 7.5 ML PER ENTERAL TUBE BID FOR 10 DAYS

## 2018-02-21 ENCOUNTER — TELEPHONE (OUTPATIENT)
Dept: PRIMARY CARE CLINIC | Facility: CLINIC | Age: 68
End: 2018-02-21

## 2018-02-21 NOTE — TELEPHONE ENCOUNTER
----- Message from Valarie Chandra sent at 2/21/2018  3:20 PM CST -----  Please call [facility] [phone] ext [phone] .. To discuss other prescriptions on the formulary

## 2018-02-23 ENCOUNTER — CLINICAL SUPPORT (OUTPATIENT)
Dept: PRIMARY CARE CLINIC | Facility: CLINIC | Age: 68
End: 2018-02-23
Payer: MEDICARE

## 2018-02-23 DIAGNOSIS — I10 ESSENTIAL HYPERTENSION, BENIGN: ICD-10-CM

## 2018-02-23 DIAGNOSIS — E11.69 TYPE 2 DIABETES MELLITUS WITH HYPERLIPIDEMIA: ICD-10-CM

## 2018-02-23 DIAGNOSIS — E78.5 TYPE 2 DIABETES MELLITUS WITH HYPERLIPIDEMIA: ICD-10-CM

## 2018-02-23 DIAGNOSIS — E78.5 HYPERLIPIDEMIA, UNSPECIFIED HYPERLIPIDEMIA TYPE: ICD-10-CM

## 2018-02-23 LAB
ALBUMIN SERPL BCP-MCNC: 3.3 G/DL
ALP SERPL-CCNC: 192 U/L
ALT SERPL W/O P-5'-P-CCNC: 9 U/L
ANION GAP SERPL CALC-SCNC: 9 MMOL/L
AST SERPL-CCNC: 16 U/L
BASOPHILS # BLD AUTO: 0.01 K/UL
BASOPHILS NFR BLD: 0.2 %
BILIRUB SERPL-MCNC: 0.4 MG/DL
BUN SERPL-MCNC: 19 MG/DL
CALCIUM SERPL-MCNC: 10 MG/DL
CHLORIDE SERPL-SCNC: 99 MMOL/L
CHOLEST SERPL-MCNC: 71 MG/DL
CHOLEST/HDLC SERPL: 4.2 {RATIO}
CO2 SERPL-SCNC: 27 MMOL/L
CREAT SERPL-MCNC: 0.8 MG/DL
DIFFERENTIAL METHOD: ABNORMAL
EOSINOPHIL # BLD AUTO: 0.1 K/UL
EOSINOPHIL NFR BLD: 0.9 %
ERYTHROCYTE [DISTWIDTH] IN BLOOD BY AUTOMATED COUNT: 21.1 %
EST. GFR  (AFRICAN AMERICAN): >60 ML/MIN/1.73 M^2
EST. GFR  (NON AFRICAN AMERICAN): >60 ML/MIN/1.73 M^2
ESTIMATED AVG GLUCOSE: 197 MG/DL
GLUCOSE SERPL-MCNC: 253 MG/DL
HBA1C MFR BLD HPLC: 8.5 %
HCT VFR BLD AUTO: 26 %
HDLC SERPL-MCNC: 17 MG/DL
HDLC SERPL: 23.9 %
HGB BLD-MCNC: 8.6 G/DL
IMM GRANULOCYTES # BLD AUTO: 0.01 K/UL
IMM GRANULOCYTES NFR BLD AUTO: 0.2 %
LDLC SERPL CALC-MCNC: 4.8 MG/DL
LYMPHOCYTES # BLD AUTO: 0.7 K/UL
LYMPHOCYTES NFR BLD: 12.5 %
MCH RBC QN AUTO: 36.8 PG
MCHC RBC AUTO-ENTMCNC: 33.1 G/DL
MCV RBC AUTO: 111 FL
MONOCYTES # BLD AUTO: 1.3 K/UL
MONOCYTES NFR BLD: 22.9 %
NEUTROPHILS # BLD AUTO: 3.5 K/UL
NEUTROPHILS NFR BLD: 63.3 %
NONHDLC SERPL-MCNC: 54 MG/DL
NRBC BLD-RTO: 0 /100 WBC
PLATELET # BLD AUTO: 127 K/UL
PMV BLD AUTO: 11.2 FL
POTASSIUM SERPL-SCNC: 5 MMOL/L
PROT SERPL-MCNC: 7.6 G/DL
RBC # BLD AUTO: 2.34 M/UL
SODIUM SERPL-SCNC: 135 MMOL/L
TRIGL SERPL-MCNC: 246 MG/DL
WBC # BLD AUTO: 5.54 K/UL

## 2018-02-23 PROCEDURE — 83036 HEMOGLOBIN GLYCOSYLATED A1C: CPT

## 2018-02-23 PROCEDURE — 99999 PR PBB SHADOW E&M-EST. PATIENT-LVL II: CPT | Mod: PBBFAC,,,

## 2018-02-23 PROCEDURE — 85025 COMPLETE CBC W/AUTO DIFF WBC: CPT

## 2018-02-23 PROCEDURE — 80053 COMPREHEN METABOLIC PANEL: CPT

## 2018-02-23 PROCEDURE — 80061 LIPID PANEL: CPT

## 2018-02-26 ENCOUNTER — CLINICAL SUPPORT (OUTPATIENT)
Dept: PRIMARY CARE CLINIC | Facility: CLINIC | Age: 68
End: 2018-02-26
Payer: MEDICARE

## 2018-02-26 DIAGNOSIS — E11.69 TYPE 2 DIABETES MELLITUS WITH HYPERLIPIDEMIA: ICD-10-CM

## 2018-02-26 DIAGNOSIS — E78.5 TYPE 2 DIABETES MELLITUS WITH HYPERLIPIDEMIA: ICD-10-CM

## 2018-02-26 DIAGNOSIS — E78.5 TYPE 2 DIABETES MELLITUS WITH HYPERLIPIDEMIA: Primary | ICD-10-CM

## 2018-02-26 DIAGNOSIS — E11.69 TYPE 2 DIABETES MELLITUS WITH HYPERLIPIDEMIA: Primary | ICD-10-CM

## 2018-02-26 DIAGNOSIS — C15.9 MALIGNANT NEOPLASM OF ESOPHAGUS, UNSPECIFIED LOCATION: ICD-10-CM

## 2018-02-26 LAB
CREAT UR-MCNC: 82 MG/DL
MICROALBUMIN UR DL<=1MG/L-MCNC: 7 UG/ML
MICROALBUMIN/CREATININE RATIO: 8.5 UG/MG

## 2018-02-26 PROCEDURE — 82043 UR ALBUMIN QUANTITATIVE: CPT

## 2018-02-26 PROCEDURE — 99999 PR PBB SHADOW E&M-EST. PATIENT-LVL I: CPT | Mod: PBBFAC,,,

## 2018-02-26 RX ORDER — GLIPIZIDE 5 MG/1
2.5 TABLET ORAL
Qty: 90 TABLET | Refills: 1 | Status: SHIPPED | OUTPATIENT
Start: 2018-02-26

## 2018-03-28 ENCOUNTER — TELEPHONE (OUTPATIENT)
Dept: PRIMARY CARE CLINIC | Facility: CLINIC | Age: 68
End: 2018-03-28

## 2018-03-28 NOTE — TELEPHONE ENCOUNTER
----- Message from Valarie Branch sent at 3/28/2018  1:27 PM CDT -----  Contact: Nory with [employer] phone [phone]  Nory with [facility] phone [phone]. Calling because she received an order for [employer] nutrition. Are you requesting Home Health. Please call her. Thanks.

## 2018-03-28 NOTE — TELEPHONE ENCOUNTER
Spoke to Smiley rawls and notified her that I think the order was faxed to the wrong place. The cover sheet for the patient in front of her paperwork was stuck to her order. I apologized

## 2018-04-16 RX ORDER — ESOMEPRAZOLE MAGNESIUM 40 MG/1
CAPSULE, DELAYED RELEASE ORAL
Qty: 90 CAPSULE | Refills: 3 | Status: SHIPPED | OUTPATIENT
Start: 2018-04-16

## 2018-05-02 RX ORDER — LISINOPRIL AND HYDROCHLOROTHIAZIDE 20; 25 MG/1; MG/1
TABLET ORAL
Qty: 90 TABLET | Refills: 1 | Status: SHIPPED | OUTPATIENT
Start: 2018-05-02

## 2018-06-22 ENCOUNTER — TELEPHONE (OUTPATIENT)
Dept: PRIMARY CARE CLINIC | Facility: CLINIC | Age: 68
End: 2018-06-22

## 2018-06-22 NOTE — TELEPHONE ENCOUNTER
----- Message from Valarie Branch sent at 6/22/2018 10:35 AM CDT -----  Contact: Patient  Type:  RX Refill Request    Who Called:  Arina, patient  Refill or New Rx:  Refill  RX Name and Strength:  esomeprazole (NEXIUM) 40 MG capsule  How is the patient currently taking it? (ex. 1XDay):  TAKE 1 CAPSULE BY MOUTH EVERY DAY  Is this a 30 day or 90 day RX:  90  Preferred Pharmacy with phone number:    [facility] [id number]  [place] - [address] AT [facility] [address]  [place]  [address] [address]  Phone: [phone] Fax: [phone]  Local or Mail Order:  Local  Ordering Provider:  Dr Gallego  [facility] Call Back Number:  [phone]  Additional Information:  Calling because she thinks she accidentally thew her medication away. Please advise. Thanks.

## 2018-06-22 NOTE — TELEPHONE ENCOUNTER
Patient notified that she has refills on the bottle. She can try and call [facility] to get them to refill it but her insurance probably wont cover it because it's too soon. She states understanding

## 2018-08-22 DIAGNOSIS — F51.01 PRIMARY INSOMNIA: ICD-10-CM

## 2018-08-23 RX ORDER — TRAZODONE HYDROCHLORIDE 50 MG/1
TABLET ORAL
Qty: 60 TABLET | Refills: 5 | Status: SHIPPED | OUTPATIENT
Start: 2018-08-23

## 2018-08-27 ENCOUNTER — OFFICE VISIT (OUTPATIENT)
Dept: PRIMARY CARE CLINIC | Facility: CLINIC | Age: 68
End: 2018-08-27
Payer: MEDICARE

## 2018-08-27 VITALS
BODY MASS INDEX: 18.12 KG/M2 | WEIGHT: 84 LBS | HEIGHT: 57 IN | OXYGEN SATURATION: 90 % | TEMPERATURE: 99 F | HEART RATE: 95 BPM | RESPIRATION RATE: 16 BRPM | SYSTOLIC BLOOD PRESSURE: 67 MMHG | DIASTOLIC BLOOD PRESSURE: 33 MMHG

## 2018-08-27 DIAGNOSIS — C15.4 MALIGNANT NEOPLASM OF MIDDLE THIRD OF ESOPHAGUS: ICD-10-CM

## 2018-08-27 DIAGNOSIS — D64.9 ANEMIA, UNSPECIFIED TYPE: ICD-10-CM

## 2018-08-27 DIAGNOSIS — Z93.1 S/P PERCUTANEOUS ENDOSCOPIC GASTROSTOMY (PEG) TUBE PLACEMENT: ICD-10-CM

## 2018-08-27 DIAGNOSIS — I95.9 HYPOTENSION, UNSPECIFIED HYPOTENSION TYPE: Primary | ICD-10-CM

## 2018-08-27 DIAGNOSIS — C78.00 MALIGNANT NEOPLASM METASTATIC TO LUNG, UNSPECIFIED LATERALITY: ICD-10-CM

## 2018-08-27 PROCEDURE — 3078F DIAST BP <80 MM HG: CPT | Mod: CPTII,S$GLB,, | Performed by: FAMILY MEDICINE

## 2018-08-27 PROCEDURE — 99214 OFFICE O/P EST MOD 30 MIN: CPT | Mod: S$GLB,,, | Performed by: FAMILY MEDICINE

## 2018-08-27 PROCEDURE — 99499 UNLISTED E&M SERVICE: CPT | Mod: S$GLB,,, | Performed by: FAMILY MEDICINE

## 2018-08-27 PROCEDURE — 3074F SYST BP LT 130 MM HG: CPT | Mod: CPTII,S$GLB,, | Performed by: FAMILY MEDICINE

## 2018-08-27 PROCEDURE — 99999 PR PBB SHADOW E&M-EST. PATIENT-LVL III: CPT | Mod: PBBFAC,,, | Performed by: FAMILY MEDICINE

## 2018-08-27 NOTE — PROGRESS NOTES
Patient, Arina Mcginnis (MRN #[id number]), presented with a recent Platelet count less than 150 K/uL consistent with the definition of thrombocytopenia (ICD10 - D69.6).    Platelets   Date Value Ref Range Status   08/27/2018 296 150 - 350 K/uL Final     The patient's thrombocytopenia was monitored, evaluated, addressed and/or treated. This addendum to the medical record is made on 08/27/2018.

## 2018-08-27 NOTE — PROGRESS NOTES
"Subjective:       Patient ID: Arina Mcginnis is a 67 y.o. female.    Chief Complaint: Hospital Follow Up (patient says she was admitted to [facility] about a month ago for a blood transfusion.)    67-year-old female with stage IV esophageal carcinoma with lung and bony metastases.  Currently receiving treatment through [facility].  She is status post PEG tube placement due to severe dysphagia.  Reports that she was recently hospitalized at [facility] due to severe anemia, transfused.  Since discharge, she has been feeling progressively worse, with worsening weakness and generalized malaise.  Denies melena or hematochezia.  Denies chest pain or shortness of breath.  Chronic, nonproductive cough.  No fevers, chills or sweats.  Denies syncope.  She reports that she has continued taking her antihypertensive medications.      Review of Systems   Constitutional: Positive for fatigue. Negative for fever.   HENT: Positive for trouble swallowing.    Eyes: Negative for visual disturbance.   Respiratory: Positive for cough. Negative for shortness of breath.    Cardiovascular: Negative for chest pain.   Gastrointestinal: Negative for blood in stool.   Genitourinary: Negative for difficulty urinating.   Musculoskeletal: Negative for joint swelling.   Skin: Negative for wound.   Neurological: Positive for dizziness and light-headedness. Negative for seizures and syncope.   Hematological: Bruises/bleeds easily.   Psychiatric/Behavioral: Negative for agitation and confusion.       Objective:      Vitals:    08/27/18 1118   BP: (!) 67/33   BP Location: Left arm   Patient Position: Sitting   BP Method: Pediatric (Automatic)   Pulse: 95   Resp: 16   Temp: 98.6 °F (37 °C)   TempSrc: Oral   SpO2: (!) 90%   Weight: 38.1 kg (84 lb)   Height: 4' 9" (1.448 m)       Physical Exam   Constitutional: She is oriented to person, place, and time. She appears well-developed. She appears cachectic. She has a sickly appearance. No distress.   HENT:   Head: Normocephalic and " atraumatic.   Eyes:   Marked conjunctival pallor   Neck: No JVD present.   Cardiovascular: Normal rate, regular rhythm and normal heart sounds.   Pulmonary/Chest: Effort normal and breath sounds normal.   Abdominal: Soft. She exhibits no distension. There is no tenderness.   PEG tube to left upper quadrant   Musculoskeletal: She exhibits no edema.   Neurological: She is alert and oriented to person, place, and time.   Skin: Skin is warm and dry. There is pallor.   Psychiatric: She has a normal mood and affect. Her behavior is normal.   Nursing note and vitals reviewed.      Assessment:       1. Hypotension, unspecified hypotension type    2. Anemia, unspecified type    3. 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 Malignant neoplasm metastatic to lung, unspecified laterality    5. S/P percutaneous endoscopic gastrostomy (PEG) tube placement        Plan:       Hypotension, unspecified hypotension type  -     Refer to Emergency Dept.    Anemia, unspecified type  -     Refer to Emergency Dept.    Malignant neoplasm of middle third of esophagus    Malignant neoplasm metastatic to lung, unspecified laterality    S/P percutaneous endoscopic gastrostomy (PEG) tube placement     Labs from [facility] reviewed.  Hemoglobin and hematocrit 6.4 and 19.5, risk of actively, on July 11th.  Most recent labs from August 22nd, hemoglobin 8.1, hematocrit 24.3.  Given degree of hypotension and recent severe anemia, patient will be sent to the emergency room for urgent evaluation.  Patient taken directly from the office via wheelchair.  ER physician notified.  Medication List with Changes/Refills   Current Medications    ESOMEPRAZOLE (NEXIUM) 40 MG CAPSULE    TAKE 1 CAPSULE BY MOUTH EVERY DAY    EZETIMIBE (ZETIA) 10 MG TABLET    TAKE 1 TABLET BY MOUTH EVERY DAY    FENTANYL (DURAGESIC) 25 MCG/HR    Place 1 patch onto the skin every 72 hours.     GLIPIZIDE (GLUCOTROL) 5 MG TABLET    0.5 tablets (2.5 mg total) by Per G Tube route 2  (two) times daily before meals.    LISINOPRIL-HYDROCHLOROTHIAZIDE (PRINZIDE,ZESTORETIC) 20-25 MG TAB    TAKE 1 TABLET BY MOUTH EVERY DAY    OXYCODONE (ROXICODONE) 5 MG/5 ML SOLN    Take 2.5 mg by mouth every 4 (four) hours.     TRAZODONE (DESYREL) 50 MG TABLET    TAKE 1 TO 2 TABLETS BY MOUTH AT BEDTIME AS NEEDED FOR INSOMNIA   Discontinued Medications    GENERLAC 10 GRAM/15 ML SOLUTION        MUPIROCIN (BACTROBAN) 2 % OINTMENT    TERRENCE TOPICALLY TID FOR 7 DAYS    OMEPRAZOLE (PRILOSEC) 40 MG CAPSULE    Take 40 mg by mouth once daily.

## 2018-09-03 ENCOUNTER — HOSPITAL ENCOUNTER (EMERGENCY)
Dept: HOSPITAL 92 - ERS | Age: 68
Discharge: HOME | End: 2018-09-03
Payer: COMMERCIAL

## 2018-09-03 DIAGNOSIS — Z87.891: ICD-10-CM

## 2018-09-03 DIAGNOSIS — J20.9: Primary | ICD-10-CM

## 2018-09-03 DIAGNOSIS — I10: ICD-10-CM

## 2018-09-03 PROCEDURE — 94640 AIRWAY INHALATION TREATMENT: CPT

## 2018-09-03 PROCEDURE — 71046 X-RAY EXAM CHEST 2 VIEWS: CPT

## 2018-09-03 NOTE — RAD
PA AND LATERAL OF THE CHEST:

 

INDICATION: 

History of cough.

 

COMPARISON: 

Prior exam dated 10/23/17.

 

FINDINGS: 

There is stable postsurgical change of prior CABG.  Mild cardiomegaly is stable.  There is stable sca
rring in the right lower lobe.  No acute osseous abnormality is grossly evident.  There is suspected 
bullous change involving the posterior aspect of the right lower lobe best seen on the lateral projec
tion.  This measures approximately 5 cm in size. 

 

IMPRESSION: 

Stable suspected scarring in the right lower lobe with associated bullous changes best seen on the la
teral projection.  No airspace consolidation to suggest acute pneumonia.  There is stable mild cardio
megaly.  There are stable postsurgical changes of a prior coronary artery bypass graft.

 

POS: LUIS ANGEL

## 2018-09-21 ENCOUNTER — TELEPHONE (OUTPATIENT)
Dept: PRIMARY CARE CLINIC | Facility: CLINIC | Age: 68
End: 2018-09-21

## 2018-09-21 RX ORDER — EZETIMIBE 10 MG/1
TABLET ORAL
Qty: 90 TABLET | Refills: 1 | Status: SHIPPED | OUTPATIENT
Start: 2018-09-21

## 2018-09-21 NOTE — TELEPHONE ENCOUNTER
----- Message from Valarie Jose Carlos sent at 9/21/2018  1:01 PM CDT -----  Contact: Patient  Type:  RX Refill Request    Who Called:  Arina, patient  Refill or New Rx:  Refill  RX Name and Strength:  ezetimibe (ZETIA) 10 mg tablet  How is the patient currently taking it? (ex. 1XDay):  TAKE 1 TABLET BY MOUTH EVERY DAY  Is this a 30 day or 90 day RX: 90  Preferred Pharmacy with phone number:    [facility] [id number]  [place] - [address] AT [facility] [address]  [place]  [address] [address]  Phone: [phone] Fax: [phone]  Local or Mail Order:  Local  Ordering Provider:  Dr Gallego  [facility] Call Back Number:  [phone]  Additional Information:  Please advise. Thanks.

## 2018-09-28 ENCOUNTER — TELEPHONE (OUTPATIENT)
Dept: PRIMARY CARE CLINIC | Facility: CLINIC | Age: 68
End: 2018-09-28

## 2018-09-28 DIAGNOSIS — C15.4 MALIGNANT NEOPLASM OF MIDDLE THIRD OF ESOPHAGUS: Primary | ICD-10-CM

## 2018-09-28 DIAGNOSIS — Z93.1 S/P PERCUTANEOUS ENDOSCOPIC GASTROSTOMY (PEG) TUBE PLACEMENT: ICD-10-CM

## 2018-09-28 DIAGNOSIS — C78.00 MALIGNANT NEOPLASM METASTATIC TO LUNG, UNSPECIFIED LATERALITY: ICD-10-CM

## 2018-09-28 NOTE — TELEPHONE ENCOUNTER
----- Message from Emmy Snell sent at 9/28/2018  8:47 AM CDT -----  Type: Needs Medical Advice    Who Called:  Patient  Best Call Back Number: [phone]  Additional Information: Patient has Stage 4 Cancer/needs home health nurse to come out to assist patient/please call patient back to advise.

## 2018-09-28 NOTE — TELEPHONE ENCOUNTER
----- Message from Chioma Rivera sent at 9/28/2018  8:46 AM CDT -----  Type: Needs Medical Advice    [facility]    Best Call Back Number: [phone]  Additional Information: Patient requesting home health at least 3 times a week. [facility] fax # [phone]

## 2018-10-02 ENCOUNTER — TELEPHONE (OUTPATIENT)
Dept: PRIMARY CARE CLINIC | Facility: CLINIC | Age: 68
End: 2018-10-02

## 2018-10-02 NOTE — TELEPHONE ENCOUNTER
I spoke to PHN. She says we have ordered nurse 3x weekly,  weekly, and an aid twice weekly. PHN approves 4 initial visits and 2 visits from an aid to eval and treat. I gave the verbal that it was fine

## 2018-10-02 NOTE — TELEPHONE ENCOUNTER
----- Message from Bhaskar Bowens sent at 10/2/2018 12:10 PM CDT -----  Type: Needs Medical Advice    Who Called:  [facility] health nurse- Emili   Symptoms (please be specific):  NA How long has patient had these symptoms:  [facility]   Pharmacy name and phone #:  [facility]LE[facility]   Best Call Back Number: [phone] Additional Information: the nurse need clarification for orders for home health, please call today

## 2018-10-03 ENCOUNTER — OFFICE VISIT (OUTPATIENT)
Dept: PRIMARY CARE CLINIC | Facility: CLINIC | Age: 68
End: 2018-10-03
Payer: MEDICARE

## 2018-10-03 VITALS
WEIGHT: 83 LBS | OXYGEN SATURATION: 94 % | TEMPERATURE: 99 F | BODY MASS INDEX: 17.91 KG/M2 | DIASTOLIC BLOOD PRESSURE: 75 MMHG | HEART RATE: 111 BPM | SYSTOLIC BLOOD PRESSURE: 127 MMHG | HEIGHT: 57 IN | RESPIRATION RATE: 16 BRPM

## 2018-10-03 DIAGNOSIS — Z93.1 S/P PERCUTANEOUS ENDOSCOPIC GASTROSTOMY (PEG) TUBE PLACEMENT: ICD-10-CM

## 2018-10-03 DIAGNOSIS — C78.00 MALIGNANT NEOPLASM METASTATIC TO LUNG, UNSPECIFIED LATERALITY: ICD-10-CM

## 2018-10-03 DIAGNOSIS — C15.4 MALIGNANT NEOPLASM OF MIDDLE THIRD OF ESOPHAGUS: Primary | ICD-10-CM

## 2018-10-03 DIAGNOSIS — G89.3 CANCER-RELATED PAIN: ICD-10-CM

## 2018-10-03 PROCEDURE — 1101F PT FALLS ASSESS-DOCD LE1/YR: CPT | Mod: CPTII,,, | Performed by: FAMILY MEDICINE

## 2018-10-03 PROCEDURE — 99213 OFFICE O/P EST LOW 20 MIN: CPT | Mod: PBBFAC,PN | Performed by: FAMILY MEDICINE

## 2018-10-03 PROCEDURE — 99214 OFFICE O/P EST MOD 30 MIN: CPT | Mod: S$PBB,,, | Performed by: FAMILY MEDICINE

## 2018-10-03 PROCEDURE — 3078F DIAST BP <80 MM HG: CPT | Mod: CPTII,,, | Performed by: FAMILY MEDICINE

## 2018-10-03 PROCEDURE — 99999 PR PBB SHADOW E&M-EST. PATIENT-LVL III: CPT | Mod: PBBFAC,,, | Performed by: FAMILY MEDICINE

## 2018-10-03 PROCEDURE — 99499 UNLISTED E&M SERVICE: CPT | Mod: S$GLB,,, | Performed by: FAMILY MEDICINE

## 2018-10-03 PROCEDURE — 3074F SYST BP LT 130 MM HG: CPT | Mod: CPTII,,, | Performed by: FAMILY MEDICINE

## 2018-10-03 RX ORDER — FENTANYL 100 UG/H
1 PATCH TRANSDERMAL
Qty: 10 PATCH | Refills: 0 | Status: SHIPPED | OUTPATIENT
Start: 2018-10-03

## 2018-10-03 RX ORDER — FENTANYL 100 UG/H
1 PATCH TRANSDERMAL
COMMUNITY
End: 2018-10-03 | Stop reason: SDUPTHER

## 2018-10-03 NOTE — PROGRESS NOTES
Subjective:       Patient ID: Arina Mcginnis is a 68 y.o. female.    Chief Complaint: Hospital Follow Up (patient was admitted due to vomiting blood. )    Pt has been hospitalized twice in the past 6 weeks with anemia, most recent hospitalization complicated by pneumonia.  She received multiple blood transfusions.  Since discharge, she has been relatively stable.  She is chronically weak and dyspneic, but no more than usual.  She has stage IV metastatic esophageal carcinoma.  No longer seeing Oncology, no further treatment options.  She has dysphagia to all solids, and is dependent upon PEG tube for all enteral nutrition.  Chronic abdominal and back pain, on transdermal fentanyl and liquid oxycodone.  She is currently on her last fentanyl patch, and this had been prescribed by her oncologist previously, but she is no longer seeing Oncology.  Has been evaluated by home hospice agency in the past, but her son did not want her to start on hospice.  However, patient expresses desire for quality of life and comfort measures, and her sister, who is present with her today, is in agreement.  They are interested in being re-evaluated for hospice admission.      Review of Systems   Constitutional: Positive for fatigue. Negative for fever.   HENT: Positive for trouble swallowing.    Eyes: Negative for visual disturbance.   Respiratory: Positive for shortness of breath.    Cardiovascular: Negative for chest pain.   Gastrointestinal: Positive for abdominal pain. Negative for blood in stool and vomiting.   Genitourinary: Negative for difficulty urinating.   Musculoskeletal: Positive for back pain.   Skin: Negative for rash.   Neurological: Positive for weakness.   Hematological: Bruises/bleeds easily.   Psychiatric/Behavioral: Negative for agitation and confusion.       Objective:      Vitals:    10/03/18 1430   BP: 127/75   BP Location: Left arm   Patient Position: Sitting   BP Method: Small (Automatic)   Pulse: (!) 111   Resp: 16  "  Temp: 98.7 °F (37.1 °C)   TempSrc: Oral   SpO2: (!) 94%   Weight: 37.6 kg (83 lb)   Height: 4' 9" (1.448 m)     Physical Exam   Constitutional: She is oriented to person, place, and time. She appears well-developed and well-nourished.   HENT:   Head: Normocephalic and atraumatic.   Eyes: EOM are normal. Pupils are equal, round, and reactive to light.   Neck: No JVD present.   Cardiovascular: Normal rate, regular rhythm and normal heart sounds.   Pulmonary/Chest: Effort normal and breath sounds normal.   Abdominal: She exhibits no distension. There is no tenderness.   Peg tube left upper quadrant   Musculoskeletal: She exhibits no edema.   Neurological: She is alert and oriented to person, place, and time.   Skin: Skin is warm and dry.   Psychiatric: She has a normal mood and affect. Her behavior is normal.   Nursing note and vitals reviewed.      Assessment:       1. Malignant neoplasm of middle third of esophagus    2. Malignant neoplasm metastatic to lung, unspecified laterality    3. Cancer-related pain    4. S/P percutaneous endoscopic gastrostomy (PEG) tube placement        Plan:       Malignant neoplasm of middle third of esophagus  -     Ambulatory Referral to Hospice  Given patient's stated desires, she would be a good candidate for hospice  Malignant neoplasm metastatic to lung, unspecified laterality  -     Ambulatory Referral to Hospice    Cancer-related pain  -     fentaNYL (DURAGESIC) 100 mcg/hr; Place 1 patch onto the skin every 72 hours.  Dispense: 10 patch; Refill: 0  -     Ambulatory Referral to Hospice  I will provide a 1 time refill of her fentanyl patch  S/P percutaneous endoscopic gastrostomy (PEG) tube placement  -     Ambulatory Referral to Hospice         Medication List           Accurate as of 10/3/18  3:29 PM. If you have any questions, ask your nurse or doctor.               CHANGE how you take these medications    fentaNYL 100 mcg/hr  Commonly known as:  DURAGESIC  Place 1 patch onto " the skin every 72 hours.  What changed:  Another medication with the same name was removed. Continue taking this medication, and follow the directions you see here.  Changed by:  Paul Gallego MD        CONTINUE taking these medications    esomeprazole 40 MG capsule  Commonly known as:  NEXIUM  TAKE 1 CAPSULE BY MOUTH EVERY DAY     ezetimibe 10 mg tablet  Commonly known as:  ZETIA  TAKE 1 TABLET BY MOUTH EVERY DAY     glipiZIDE 5 MG tablet  Commonly known as:  GLUCOTROL  0.5 tablets (2.5 mg total) by Per G Tube route 2 (two) times daily before meals.     lisinopril-hydrochlorothiazide 20-25 mg Tab  Commonly known as:  PRINZIDE,ZESTORETIC  TAKE 1 TABLET BY MOUTH EVERY DAY     oxyCODONE 5 mg/5 mL Soln  Commonly known as:  ROXICODONE     traZODone 50 MG tablet  Commonly known as:  DESYREL  TAKE 1 TO 2 TABLETS BY MOUTH AT BEDTIME AS NEEDED FOR INSOMNIA           Where to Get Your Medications      These medications were sent to [id number] - [place] - [address] AT [facility] [address]  [address], [place] [address]    Phone:  [phone]   · fentaNYL 100 mcg/hr

## 2018-10-04 ENCOUNTER — TELEPHONE (OUTPATIENT)
Dept: PRIMARY CARE CLINIC | Facility: CLINIC | Age: 68
End: 2018-10-04

## 2018-10-05 ENCOUNTER — TELEPHONE (OUTPATIENT)
Dept: PRIMARY CARE CLINIC | Facility: CLINIC | Age: 68
End: 2018-10-05

## 2018-10-05 NOTE — TELEPHONE ENCOUNTER
----- Message from Bhaskar Bowens sent at 10/5/2018  1:55 PM CDT -----  Type: Needs Medical Advice    Who Called:  [place] [facility] nurse- Imelda Meza (please be specific):  NA   How long has patient had these symptoms:    Pharmacy name and phone #:  NA   Best Call Back Number: [phone]  Additional Information: The nurse called stating the hospice orders can directly to  medical hospice company put evaluate and treat: Marci Mendes. Any questions call the nurse back.

## 2018-10-12 ENCOUNTER — TELEPHONE (OUTPATIENT)
Dept: ADMINISTRATIVE | Facility: CLINIC | Age: 68
End: 2018-10-12

## 2018-10-12 NOTE — TELEPHONE ENCOUNTER
Home Health SOC 10/04/2018 to 12/02/2018 with [facility]. SN, MSW service.

## 2018-10-26 ENCOUNTER — TELEPHONE (OUTPATIENT)
Dept: PRIMARY CARE CLINIC | Facility: CLINIC | Age: 68
End: 2018-10-26

## 2018-10-26 NOTE — TELEPHONE ENCOUNTER
----- Message from Radha Simon sent at 10/26/2018  3:32 PM CDT -----  Contact: tanisha lopez/ yonathan ph#[phone]  tanisha lopez/ yonathan ph#[phone]  Call stated patient moved to [place]

## 2019-11-26 ENCOUNTER — HOSPITAL ENCOUNTER (INPATIENT)
Dept: HOSPITAL 92 - ERS | Age: 69
LOS: 4 days | Discharge: HOME | DRG: 166 | End: 2019-11-30
Attending: INTERNAL MEDICINE | Admitting: INTERNAL MEDICINE
Payer: MEDICARE

## 2019-11-26 VITALS — BODY MASS INDEX: 21.7 KG/M2

## 2019-11-26 DIAGNOSIS — K64.4: ICD-10-CM

## 2019-11-26 DIAGNOSIS — I25.10: ICD-10-CM

## 2019-11-26 DIAGNOSIS — K64.8: ICD-10-CM

## 2019-11-26 DIAGNOSIS — E78.5: ICD-10-CM

## 2019-11-26 DIAGNOSIS — F17.200: ICD-10-CM

## 2019-11-26 DIAGNOSIS — Z79.82: ICD-10-CM

## 2019-11-26 DIAGNOSIS — I25.2: ICD-10-CM

## 2019-11-26 DIAGNOSIS — R04.2: ICD-10-CM

## 2019-11-26 DIAGNOSIS — I50.32: ICD-10-CM

## 2019-11-26 DIAGNOSIS — J47.1: Primary | ICD-10-CM

## 2019-11-26 DIAGNOSIS — Z90.710: ICD-10-CM

## 2019-11-26 DIAGNOSIS — I13.0: ICD-10-CM

## 2019-11-26 DIAGNOSIS — Z95.1: ICD-10-CM

## 2019-11-26 DIAGNOSIS — N18.2: ICD-10-CM

## 2019-11-26 DIAGNOSIS — J85.1: ICD-10-CM

## 2019-11-26 LAB
ALBUMIN SERPL BCG-MCNC: 4.3 G/DL (ref 3.4–4.8)
ALP SERPL-CCNC: 177 U/L (ref 40–110)
ALT SERPL W P-5'-P-CCNC: 23 U/L (ref 8–55)
ANION GAP SERPL CALC-SCNC: 13 MMOL/L (ref 10–20)
APTT PPP: 22.6 SEC (ref 22.9–36.1)
AST SERPL-CCNC: 28 U/L (ref 5–34)
BACTERIA UR QL AUTO: (no result) HPF
BASOPHILS # BLD AUTO: 0.1 THOU/UL (ref 0–0.2)
BASOPHILS NFR BLD AUTO: 1.3 % (ref 0–1)
BILIRUB SERPL-MCNC: 0.3 MG/DL (ref 0.2–1.2)
BUN SERPL-MCNC: 14 MG/DL (ref 9.8–20.1)
CALCIUM SERPL-MCNC: 9.1 MG/DL (ref 7.8–10.44)
CHLORIDE SERPL-SCNC: 106 MMOL/L (ref 98–107)
CO2 SERPL-SCNC: 23 MMOL/L (ref 23–31)
CREAT CL PREDICTED SERPL C-G-VRATE: 0 ML/MIN (ref 70–130)
EOSINOPHIL # BLD AUTO: 0.2 THOU/UL (ref 0–0.7)
EOSINOPHIL NFR BLD AUTO: 4.5 % (ref 0–10)
GLOBULIN SER CALC-MCNC: 3.3 G/DL (ref 2.4–3.5)
GLUCOSE SERPL-MCNC: 125 MG/DL (ref 80–115)
HGB BLD-MCNC: 12.3 G/DL (ref 12–16)
HGB BLD-MCNC: 12.4 G/DL (ref 12–16)
HGB BLD-MCNC: 12.6 G/DL (ref 12–16)
HGB BLD-MCNC: 13.3 G/DL (ref 12–16)
IGA SERPL-MCNC: 268 MG/DL (ref 69–517)
IGG SERPL-MCNC: 1142 MG/DL (ref 552–1631)
IGM SERPL-MCNC: 36 MG/DL (ref 33–293)
INR PPP: 1
LEUKOCYTE ESTERASE UR QL STRIP.AUTO: 250 LEU/UL
LYMPHOCYTES # BLD: 2 THOU/UL (ref 1.2–3.4)
LYMPHOCYTES NFR BLD AUTO: 41.6 % (ref 21–51)
MCH RBC QN AUTO: 29.1 PG (ref 27–31)
MCV RBC AUTO: 88.6 FL (ref 78–98)
MONOCYTES # BLD AUTO: 0.3 THOU/UL (ref 0.11–0.59)
MONOCYTES NFR BLD AUTO: 6.4 % (ref 0–10)
NEUTROPHILS # BLD AUTO: 2.2 THOU/UL (ref 1.4–6.5)
NEUTROPHILS NFR BLD AUTO: 46.1 % (ref 42–75)
PLATELET # BLD AUTO: 213 THOU/UL (ref 130–400)
POTASSIUM SERPL-SCNC: 3.8 MMOL/L (ref 3.5–5.1)
PROTHROMBIN TIME: 12.8 SEC (ref 12–14.7)
RBC # BLD AUTO: 4.58 MILL/UL (ref 4.2–5.4)
RBC UR QL AUTO: (no result) HPF (ref 0–3)
SODIUM SERPL-SCNC: 138 MMOL/L (ref 136–145)
WBC # BLD AUTO: 4.7 THOU/UL (ref 4.8–10.8)

## 2019-11-26 PROCEDURE — 87040 BLOOD CULTURE FOR BACTERIA: CPT

## 2019-11-26 PROCEDURE — 86256 FLUORESCENT ANTIBODY TITER: CPT

## 2019-11-26 PROCEDURE — 83605 ASSAY OF LACTIC ACID: CPT

## 2019-11-26 PROCEDURE — 89051 BODY FLUID CELL COUNT: CPT

## 2019-11-26 PROCEDURE — 87102 FUNGUS ISOLATION CULTURE: CPT

## 2019-11-26 PROCEDURE — 80053 COMPREHEN METABOLIC PANEL: CPT

## 2019-11-26 PROCEDURE — 96365 THER/PROPH/DIAG IV INF INIT: CPT

## 2019-11-26 PROCEDURE — 86200 CCP ANTIBODY: CPT

## 2019-11-26 PROCEDURE — 96375 TX/PRO/DX INJ NEW DRUG ADDON: CPT

## 2019-11-26 PROCEDURE — 82785 ASSAY OF IGE: CPT

## 2019-11-26 PROCEDURE — 87070 CULTURE OTHR SPECIMN AEROBIC: CPT

## 2019-11-26 PROCEDURE — 86901 BLOOD TYPING SEROLOGIC RH(D): CPT

## 2019-11-26 PROCEDURE — 96361 HYDRATE IV INFUSION ADD-ON: CPT

## 2019-11-26 PROCEDURE — 87086 URINE CULTURE/COLONY COUNT: CPT

## 2019-11-26 PROCEDURE — 85025 COMPLETE CBC W/AUTO DIFF WBC: CPT

## 2019-11-26 PROCEDURE — 88112 CYTOPATH CELL ENHANCE TECH: CPT

## 2019-11-26 PROCEDURE — 86606 ASPERGILLUS ANTIBODY: CPT

## 2019-11-26 PROCEDURE — 86900 BLOOD TYPING SEROLOGIC ABO: CPT

## 2019-11-26 PROCEDURE — 81001 URINALYSIS AUTO W/SCOPE: CPT

## 2019-11-26 PROCEDURE — 83520 IMMUNOASSAY QUANT NOS NONAB: CPT

## 2019-11-26 PROCEDURE — 85060 BLOOD SMEAR INTERPRETATION: CPT

## 2019-11-26 PROCEDURE — 87449 NOS EACH ORGANISM AG IA: CPT

## 2019-11-26 PROCEDURE — 86635 COCCIDIOIDES ANTIBODY: CPT

## 2019-11-26 PROCEDURE — 36415 COLL VENOUS BLD VENIPUNCTURE: CPT

## 2019-11-26 PROCEDURE — 82103 ALPHA-1-ANTITRYPSIN TOTAL: CPT

## 2019-11-26 PROCEDURE — 87077 CULTURE AEROBIC IDENTIFY: CPT

## 2019-11-26 PROCEDURE — 86612 BLASTOMYCES ANTIBODY: CPT

## 2019-11-26 PROCEDURE — 85730 THROMBOPLASTIN TIME PARTIAL: CPT

## 2019-11-26 PROCEDURE — 87116 MYCOBACTERIA CULTURE: CPT

## 2019-11-26 PROCEDURE — 71275 CT ANGIOGRAPHY CHEST: CPT

## 2019-11-26 PROCEDURE — 85610 PROTHROMBIN TIME: CPT

## 2019-11-26 PROCEDURE — 87206 SMEAR FLUORESCENT/ACID STAI: CPT

## 2019-11-26 PROCEDURE — C9113 INJ PANTOPRAZOLE SODIUM, VIA: HCPCS

## 2019-11-26 PROCEDURE — 80202 ASSAY OF VANCOMYCIN: CPT

## 2019-11-26 PROCEDURE — 80048 BASIC METABOLIC PNL TOTAL CA: CPT

## 2019-11-26 PROCEDURE — 76000 FLUOROSCOPY <1 HR PHYS/QHP: CPT

## 2019-11-26 PROCEDURE — 88305 TISSUE EXAM BY PATHOLOGIST: CPT

## 2019-11-26 PROCEDURE — 86698 HISTOPLASMA ANTIBODY: CPT

## 2019-11-26 PROCEDURE — 87205 SMEAR GRAM STAIN: CPT

## 2019-11-26 PROCEDURE — 86850 RBC ANTIBODY SCREEN: CPT

## 2019-11-26 RX ADMIN — Medication SCH ML: at 21:03

## 2019-11-26 RX ADMIN — Medication SCH ML: at 09:58

## 2019-11-26 NOTE — PDOC.EVN
Event Note





- Event Note


Event Note: 





Seen and examined. Patient states overall that she is feeling better. Has 

noticed some blood in stool, though she states she has hemorrhoids. Patient 

denies had prior history of colonoscopy. Hold anti platelet therapy until after 

C-scope, Hgb not significantly diminished.  Patient breathing well on room air, 

though there are faint wheezing in the right lower lung field. Pulmonology 

evaluation pending, possible cavitary lesion on imaging.

## 2019-11-26 NOTE — CON
DATE OF CONSULTATION:  11/26/2019



SERVICE:  Pulmonary Medicine.



REASON FOR CONSULTATION:  Hemoptysis.



HISTORY OF PRESENT ILLNESS:  The patient is a 69-year-old  
female

with past medical history significant for essentially nothing.  About 2 months 
ago,

she started having cough on a daily basis and was bringing up yellow phlegm.  
She

never felt sick in any way.  That being said, this is new for her.  She denies

having any acid reflux type symptoms.  Two days ago, she started having a little

nausea and vomited on one occasion.  She did not think much of that.  This 
seemed to

clear, she is able to keep down food afterwards.  That being said, on the day of

presentation, she started having massive amounts of hemoptysis.  She was 
swallowing

some of that blood and also vomiting.  Sometimes, she had a little bit of bloody

emesis.  It is not clear if this is all stemming from the lung or the GI tract,

perhaps both.  She denies any hot, red, or swollen joints, rashes, arthritis,

arthralgias, ever having hematuria, pleural effusion, or pericardial effusion.  
She

currently denies any fevers or chills.  Since she has been in the hospital, she 
was

put on some antibiotics.  She actually started to feel a little bit better.  
She is

still coughing, but not bringing up any phlegm any longer. 



PAST MEDICAL HISTORY:  

1. Coronary artery disease.

2. Hypertension.

3. Dyslipidemia.

4. History of myocardial infarction.



PAST SURGICAL HISTORY:  

1. Coronary artery bypass graft x3 vessels.

2. Hysterectomy.



SOCIAL HISTORY:  She smokes on a daily basis.  She denies any recreational drug 
use

or alcohol.  She has no exposure to chemicals, dust, asbestos, or tuberculosis. 



FAMILY HISTORY:  Noncontributory.



ALLERGIES:  NO KNOWN DRUG ALLERGIES.



MEDICATIONS:  List of her inpatient medications was reviewed.  No specific 
updates

were made at this time. 



REVIEW OF SYSTEMS:  General; head, ears, eyes, nose, and throat; cardiovascular;

respiratory; GI; ; musculoskeletal; neurologic; and skin is negative except as

mentioned is the HPI. 



PHYSICAL EXAMINATION:

VITAL SIGNS:  Afebrile, pulse 69, blood pressure 150/79, respirations 16, 
saturation

98% currently on room air. 

GENERAL:  The patient is awake and alert, in no apparent distress. 

LUNGS:  Decent air entry.  There are some rhonchi on the right.  No prolonged

expiratory phase or wheezing is appreciated. 

HEART:  Normal rate.  Regular. 

ABDOMEN:  Soft, nontender, and nondistended.  Bowel sounds are positive. 

MUSCULOSKELETAL:  No cyanosis or clubbing.  There is no pitting in the bilateral

lower extremities. 

NEUROLOGIC:  Grossly nonfocal.



LABORATORY DATA:  Hemoglobins are stable at 12.4 and WBC 4.7.  Differential 
remains

normal.  INR 1.0.  Creatinine 1.37.  Basic metabolic profile, liver function

studies, and lactate are unremarkable.  IgG 1142, IgA 268, and IgM 36, all 
falling

within the normal limits. 



IMAGING STUDIES:  CTA of the chest demonstrates no evidence of a PE.  She has

bronchiectasis in the bibasilar regions, which is a little bit worse on the 
right

compared to the left.  There is an infiltrate on the right posterior lung 
segments.

The bronchiectasis seems to be quite a bit more severe in that region, there 
may be

a cavitary lesion present.  There is a small amount of infiltrate.  Subtle

ground-glass opacifications are present in the centrilobular distribution in 
that

area.  That being said, she has a massively dilated left atrium, and subtle 
evidence

of volume overload including interstitial fullness, and prominent intrapulmonary

lymph nodes/small pulmonary nodules scattered throughout bilateral lung fields. 



ASSESSMENT:  

1. Massive hemoptysis.

2. Bronchiectasis with acute exacerbation.

3. Pulmonary cavity.

4. Community-acquired pneumonia, possible.

5. Gastrointestinal bleed?



DISCUSSION AND PLAN:  We will follow up the results of the colonoscopy.

That being said, we will put her through a thorough workup for both 
bronchiectasis

and cavitary lung disease.  This includes an IgE, alpha-1 antitrypsin, 
rheumatoid

factor, pan-ANCA, Fungitell, antibody panel, and Aspergillus studies.  She will

likely require repeat CT of the chest in 4 to 6 weeks in the outpatient 
setting.  If

this thing fails to clear, a bronchoscopy will certainly need to be considered.
  I

agree with current antibiotic selection.  We will give her a brief course of

steroids to help decrease inflammation of any airways that may have become 
unroofed.

My suspicion is that we will be looking at a bronchoscopy at a followup 
appointment,

but I would like to see the results of the colon 1st.  If she truly has both

lung and GI bleeding that present at the same time, certainly vasculitides would

need to be considered.  As such, urine will be collected looking for hematuria 
and

casts. 



70 minutes have been devoted to this patient in various activities.  I 
personally reviewed all imaging studies and laboratory data noted within this 
document.  For fifty percent of this time, I was interacting with the patient 
at the bedside or coordinating care with the care team.  For the remainder of 
the time I was immediately available to the patient in the hospital unit.  



Job ID:  [id number]



MTDD

## 2019-11-26 NOTE — CT
PRELIMINARY REPORT/[facility]/EMERGENCY AFTER

HOURS PROCEDURE: 

 

PROCEDURE INFORMATION:

Exam: CT Angiography Chest With Contrast

Exam date and time: 11/26/2019 1:44 AM

Age: 69 years old

 

Clinical history: Cough with hemorrhage; Patient HX: 69f presents for evaluation of cough since yeste
rday which she says is productive, sputum is bright red and bloody. PT also C/O william red blood in st
ool and on her pants. PT says she came in because her granddaughter encouraged her.

Endorses SOB. Pmhx includes cabg, mi.

 

TECHNIQUE:

Imaging protocol: Computed tomographic angiography of the chest with intravenous contrast.

3D rendering: MIP reconstructed images were created and reviewed.

 

COMPARISON:

No relevant prior studies available.

 

FINDINGS:

Pulmonary arteries: Adequate contrast enhancement of the pulmonary arteries.

Aorta: No evidence of thoracic aortic dissection. 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
cic aorta. Chronic atherosclerotic calcification of the vasculature.

Lungs: Moderate linear and patchy air space disease in right lower lobe with area central cavitation/
necrosis. Multifocal areas hazy opacity right middle and bilateral lower lobes, possible areas inflam
mation vs. edema. No evidence endobronchial lesion.

Pleural space: No evidence of pneumothorax.

Heart: Heart appears within normal limits, no pericardial effusion. No evidence of filling defects in
 the cardiac chambers.

Lymph nodes: Few subcentimeter mediastinal lymph nodes.

Bones/joints: Musculoskeletal structures appear intact. Multi-level degenerative changes involve the 
thoracic spine. Sternal sutures.

Soft tissues: Unremarkable.

 

IMPRESSION:

1. No evidence of pulmonary embolism.

2. No evidence of thoracic aortic dissection.

3. 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

4. Suspected right lower lobe consolidation/ pneumonia with areas necrosis/cavitation.

5. Multifocal areas hazy opacity right middle and bilateral lower lobes, possible areas inflammation 
vs. edema.

 

Thank you for allowing us to participate in the care of your patient.

Dictated and Authenticated by: Kike Erazo MD

11/26/2019 2:21 AM Central Time ([place] & [place])

 

 

 

FINAL REPORT 

 

EMERGENCY AFTER HOURS CTA CHEST:

 

FINDINGS/IMPRESSION: 

I agree with the findings and impression given in the preliminary report per [facility] physician. 

1.  No evidence of pulmonary thromboembolism. 

2.  Right lower lobe infiltrate with areas of cavitation. 

 

 

POS: CET

## 2019-11-26 NOTE — HP
PRIMARY CARE PROVIDER:  Jered Viveros MD



CHIEF COMPLAINT:  Coughing blood.



HISTORY OF PRESENT ILLNESS:  Ms. Kwong is a pleasant 69-year-old lady, who was seen

at [facility] on November 26, 2019. 



She reports that she coughed blood twice over the last couple of days.  Her

granddaughter also noticed that she had blood in her pants.  She was encouraged to

go to the emergency room by her granddaughter. 



The patient denies any chest pain or shortness of breath.  She denies any fevers.

She denies any abdominal pain.  She reports that she coughed blood before she had

heart attack a couple of years ago. 



REVIEW OF SYSTEMS:  All systems were reviewed and found to be negative except for

the pertinent positives mentioned above. 



PAST MEDICAL HISTORY:  Coronary artery disease, hypertension, myocardial infarction,

dyslipidemia. 



PAST SURGICAL HISTORY:  Three-vessel coronary artery bypass graft and hysterectomy.



SOCIAL HISTORY:  The patient denies tobacco use or alcohol use.  She is an

ex-smoker.  She denies recreational drug use. 



FAMILY HISTORY:  Brother with pacemaker.



ALLERGIES:  NO KNOWN DRUG ALLERGIES.



CURRENT MEDICATIONS:  These need to be clarified, but the patient appears to be

taking 

1. Plavix 75 mg daily.

2. Pravastatin 40 mg daily.

3. Aspirin 81 mg daily.

4. Carvedilol 12.5 mg 2 times a day.



PHYSICAL EXAMINATION:

GENERAL:  On examination, Ms. Kwong is awake and alert, not in acute distress. 

VITAL SIGNS:  Blood pressure is 172/85, pulse 78, respiratory rate 19, and oxygen

saturation 99% on room air.  She is afebrile. 

EYES:  No scleral icterus, no conjunctival pallor. 

ENT:  Moist mucosal membranes.  No oropharyngeal erythema or exudates. 

NECK:  Supple, nontender, trachea is midline. 

RESPIRATORY:  Accessory muscles of breathing are not active.  Chest wall movements

are symmetric bilaterally.  Lungs are clear to auscultation without wheeze, rhonchi,

or crepitations. 

CARDIOVASCULAR:  S1 and S2 are heard, regular.  Peripheral pulses palpable. 

ABDOMEN:  Soft, nontender, bowel sounds heard. 

NEUROLOGIC:  Cranial nerves 2 through 12 are intact. 

MUSCULOSKELETAL:  Power is 5/5 in all 4 extremities. 

SKIN:  No rashes. 

LYMPHATIC:  No cervical lymphadenopathy. 

PSYCHIATRIC:  Normal mood, normal affect, the patient is oriented to person, place,

and time. 



LABORATORY DATA:  Ms. Kwong's labs and investigations were reviewed.  She had CT

angiogram of the chest, which did not show any evidence of pulmonary embolism or

thoracic aortic dissection.  She had mild, up to 3 cm ectasia/aneurysm of ascending

thoracic aorta.  She had suspected right lower lobe consolidation/pneumonia with

areas of necrosis/cavitation.  She had multifocal areas, hazy opacity, right middle

and bilateral lower lobes, possible areas, inflammation versus edema. 



She has leukopenia with 4700 white cells, of which 46% are neutrophils.  Hemoglobin

and platelet count are normal.  INR is 1.0.  Creatinine is elevated at 1.37, it was

1.48 on February 9, 2018.  Alkaline phosphatase is elevated at 177.  It was 121 on

February 9, 2018, elevated at 182 on October 23, 2017.  Chem-7 is otherwise

unremarkable.  Lactic acid is normal. 



ASSESSMENT AND PLAN:  Ms. Kwong is a pleasant 69-year-old lady, who was seen at [facility] on November 26, 2019.  Her problem list includes: 

1. Hemoptysis:  Ms. Kwong is presenting with hemoptysis, most likely secondary to

pneumonia.  She will be admitted to the hospital for further management. 

2. Pneumonia:  She has received vancomycin and Zosyn in the emergency room, which I

will continue.  Pulmonology service is being consulted for opinion and help with

management. 

3. Coronary artery disease:  We will resume home medications once they are clarified.

4. Hypertension:  Resume home medications, monitor vital signs and titrate

antihypertensives as needed. 

5. Dyslipidemia:  Continue statin.

6. Coronary artery disease:  Appears to be stable, the patient denies any chest pain.

7. Lower gastrointestinal bleed:  The patient denies any abdominal pain, could be

diverticular bleed versus hemorrhoidal.  GI service is being consulted for opinion

and help with management.  We will recheck H and H. 

Many thanks for allowing me to participate in your patient's care.  Please feel free

to contact me with any questions or concerns. 



LEVEL OF RISK:  Moderate.



LEVEL OF COMPLEXITY:  Moderate.







Job ID:  [id number]

## 2019-11-26 NOTE — CON
DATE OF CONSULTATION:  11/26/2019



CHIEF COMPLAINT:  Rectal bleeding.



HISTORY OF PRESENT ILLNESS:  Ms. Kwong is a 69-year-old female, who was brought to

the ER by her granddaughter.  Her primary complaint on admission was coughing up

blood for the last 2 days prior to admission.  She reports having chronic cough for

the last several weeks.  She denies any weight loss, fever, night sweats, or chills.

 Reportedly prior to admission, her granddaughter noted that her pants were stained

with blood.  The patient herself was not aware that she had any rectal bleeding or

lower GI bleed.  She has not had any nausea or vomiting.  She does report some

abdominal soreness with coughing, but nothing localizing or significant.  She

reports having normal bowel function with occasional bright red blood bleeding

attributed to hemorrhoids.  She never had screening colonoscopy in the past.  There

is no previous gastrointestinal problem. 



PAST MEDICAL HISTORY:  

1. Coronary artery disease, status post bypass surgery.

2. Hypertension.

3. Hyperlipidemia.



ALLERGIES:  NO KNOWN DRUG ALLERGY.



MEDICATIONS AT HOME:  Include:

1. Aspirin.

2. Plavix.

3. Coreg.

4. Pravastatin.



SOCIAL HISTORY:  The patient lives with her  and 2 grandchildren.  She does

smoke a pack a week.  Occasional beer, once a week. 



FAMILY HISTORY:  Negative for any known GI problem, liver disease, or GI malignancy.



REVIEW OF SYSTEMS:  Ten-point review of systems did not show any other reported

symptoms or problem. 



PHYSICAL EXAMINATION:

VITAL SIGNS:  Temperature is 97.2, blood pressure 162/80, pulse of 76. 

GENERAL:  She is alert and in no distress. 

HEENT:  Shows anicteric sclerae.  Oropharynx clear and moist. 

NECK:  Supple. 

CV:  Shows normal S1 and S2.  Regular rate and rhythm. 

CHEST:  Shows breath sounds.  Poor excursion. 

ABDOMEN:  Soft and nontender.  There is no distention.  No tympany.  She has active

bowel sounds.  There is no appreciable organomegaly. 

EXTREMITIES:  Show no edema.



LABORATORY DATA:  WBCs 4.7, hemoglobin 13.3, hematocrit 40.6, and platelet count of

213.  Electrolytes within normal range.  Creatinine 1.37.  LFTs are normal.

Alkaline phosphatase 177. 



IMPRESSION:  

1. Reported rectal bleeding prior to admission as noted by her granddaughter without

the patient's awareness.  She has not had any further bleeding since admission,

overnight, or this morning.  She does have occasional bright red rectal bleeding

that she attributed to hemorrhoids. 

2. Hemoptysis with x-ray showing right lower lobe consolidation and areas of

necrosis, cavitation, consistent with pneumonia. 

3. Chest CT angiogram with a 3 cm ascending thoracic aortic aneurysm without

dissection. 



RECOMMENDATION:  

1. Proceed with colonoscopy as the patient never had prior colon screening.  Prep

today with procedure tomorrow. 

2. Indication including risks discussed with MsMarsha Kwong.  All questions answered.

3. Further recommendation to follow pending endoscopic finding.







Job ID:  [id number]

## 2019-11-27 LAB
ANION GAP SERPL CALC-SCNC: 14 MMOL/L (ref 10–20)
BASOPHILS # BLD AUTO: 0 THOU/UL (ref 0–0.2)
BASOPHILS NFR BLD AUTO: 0.4 % (ref 0–1)
BUN SERPL-MCNC: 9 MG/DL (ref 9.8–20.1)
CALCIUM SERPL-MCNC: 9.4 MG/DL (ref 7.8–10.44)
CHLORIDE SERPL-SCNC: 106 MMOL/L (ref 98–107)
CO2 SERPL-SCNC: 23 MMOL/L (ref 23–31)
CREAT CL PREDICTED SERPL C-G-VRATE: 43 ML/MIN (ref 70–130)
EOSINOPHIL # BLD AUTO: 0 THOU/UL (ref 0–0.7)
EOSINOPHIL NFR BLD AUTO: 0.2 % (ref 0–10)
GLUCOSE SERPL-MCNC: 140 MG/DL (ref 80–115)
HGB BLD-MCNC: 11.7 G/DL (ref 12–16)
LYMPHOCYTES # BLD: 1 THOU/UL (ref 1.2–3.4)
LYMPHOCYTES NFR BLD AUTO: 11.2 % (ref 21–51)
MCH RBC QN AUTO: 28.1 PG (ref 27–31)
MCV RBC AUTO: 87.1 FL (ref 78–98)
MONOCYTES # BLD AUTO: 0.3 THOU/UL (ref 0.11–0.59)
MONOCYTES NFR BLD AUTO: 3.4 % (ref 0–10)
NEUTROPHILS # BLD AUTO: 7.7 THOU/UL (ref 1.4–6.5)
NEUTROPHILS NFR BLD AUTO: 84.8 % (ref 42–75)
NEUTROPHILS NFR FLD: 55 %
NONHEMATIC CELLS NFR FLD MANUAL: 13 %
PLATELET # BLD AUTO: 192 THOU/UL (ref 130–400)
POTASSIUM SERPL-SCNC: 3.6 MMOL/L (ref 3.5–5.1)
RBC # BLD AUTO: 4.18 MILL/UL (ref 4.2–5.4)
RBC # FLD AUTO: (no result) /CUMM
SODIUM SERPL-SCNC: 139 MMOL/L (ref 136–145)
WBC # BLD AUTO: 9 THOU/UL (ref 4.8–10.8)
WBC # FLD MANUAL: 721 /CUMM

## 2019-11-27 PROCEDURE — 0B9F8ZX DRAINAGE OF RIGHT LOWER LUNG LOBE, VIA NATURAL OR ARTIFICIAL OPENING ENDOSCOPIC, DIAGNOSTIC: ICD-10-PCS | Performed by: INTERNAL MEDICINE

## 2019-11-27 PROCEDURE — 0DJD8ZZ INSPECTION OF LOWER INTESTINAL TRACT, VIA NATURAL OR ARTIFICIAL OPENING ENDOSCOPIC: ICD-10-PCS | Performed by: INTERNAL MEDICINE

## 2019-11-27 PROCEDURE — 0BBF8ZX EXCISION OF RIGHT LOWER LUNG LOBE, VIA NATURAL OR ARTIFICIAL OPENING ENDOSCOPIC, DIAGNOSTIC: ICD-10-PCS | Performed by: INTERNAL MEDICINE

## 2019-11-27 RX ADMIN — Medication SCH ML: at 20:19

## 2019-11-27 RX ADMIN — HYDROCORTISONE ACETATE SCH MG: 25 SUPPOSITORY RECTAL at 21:53

## 2019-11-27 RX ADMIN — Medication SCH ML: at 08:55

## 2019-11-27 NOTE — PRG
DATE OF SERVICE:  11/27/2019



SERVICE:  Pulmonary Medicine.



INTERVAL HISTORY:  The patient is breathing comfortably.  She had a bowel prep

yesterday.  She cleared out for the most part, but towards the end, started having a

little bit of blood.  She could not tell if it is hemorrhoidal bleeding or whether

or not it was actually part of the stool.  She denies any fevers or chills.

Otherwise, there has been no interval change to her condition.  She has not had any

recurrent hemoptysis present. 



PHYSICAL EXAMINATION:

VITAL SIGNS:  Afebrile, pulse 71, blood pressure 133/75, respirations 19, saturation

98%, currently on room air. 

GENERAL:  The patient is awake and alert, in no apparent distress. 

LUNGS:  Good air entry bilaterally.  Rhonchi has improved on the right.  No

prolonged expiratory phase or wheezing is appreciated. 

HEART:  Normal rate regular. 

ABDOMEN:  Soft, nontender, nondistended, bowel sounds are positive. 

MUSCULOSKELETAL:  No cyanosis or clubbing.  There is no pitting in the bilateral

lower extremities. 

NEUROLOGIC:  Grossly nonfocal.



LABORATORY DATA:  Hemoglobin 11.7 and gently downtrending.  Basic metabolic profile

is otherwise unremarkable.  Neutrophil count has increased, likely steroid effect.

INR 1.0.  Creatinine has improved to 1.03.  Otherwise, basic metabolic profile is

unremarkable.  Urinalysis was significant for minimal red blood cells, and 11 to 12

white blood cells.  Of note, this was a clean catch.  The leukocyte esterase was

marginally elevated, but nitrites were negative.  Bacteria was 1+.  There were very

few epithelial cells present.  Multiple laboratories are currently pending. 



ASSESSMENT:  

1. Massive hemoptysis.

2. Bronchiectasis with acute exacerbation.

3. Pulmonary cavity.

4. Community acquired pneumonia, possible.

5. Gastrointestinal bleed, possible.



DISCUSSION AND PLAN:  The patient is going to go for an EGD and colonoscopy today.

Workup for the bronchiectasis and cavitary disease is pending including IgE, alpha-1

antitrypsin, rheumatoid factor, pan ANCA, Fungitell, fungal antibody panel, and

Aspergillus studies.  I will see if I can coordinate a bronchoscopy with the already

scheduled procedures.  Ultimately, a repeat CT of the chest is going to be required

in 4 to 6 weeks to see whether or not any of these lesions persist, particularly if

we do not get any answer with any of these studies or procedures. 







Job ID:  [id number]

## 2019-11-27 NOTE — OP
DATE OF PROCEDURE:  11/27/2019



SERVICE:  Pulmonary Medicine.



PROCEDURES PERFORMED:  Fiberoptic bronchoscopy with:

1. Visual airway inspection.

2. Endobronchial brushing from the right lower lobe.

3. Bronchoalveolar lavage from the right lower lobe.

4. Transbronchial biopsies from the right lower lobe.



PREPROCEDURE DIAGNOSIS:  Pulmonary cavity/infiltrate.



POSTPROCEDURE DIAGNOSIS:  Pulmonary cavity/infiltrate.



MEDICATIONS USED:  Please refer to Anesthesia documentation.



PREANESTHESIA ASSESSMENT:  H and P had been performed.  The patient's medications

and allergies were reviewed.  Informed consent was obtained after discussing the

risks, benefits, and rationale for performing the procedure as well as alternative

options. 



DESCRIPTION OF PROCEDURE:  A time-out was performed identifying the correct

procedure and patient with name and date of birth.  A diagnostic fiberoptic

bronchoscope was introduced through the 8.0 endotracheal tube.  The bronchoscope was

advanced into the trachea, where a tracheobronchial tree inspection was carried out.

 There was clear identification of the right upper lobe, right middle lobe, right

lower lobe, left upper lobe, lingula, and left lower lobe.  There were multiple

aberrant segments coming out of the right lower lobe.  Some old blood was in the

lateral and posterior segments of the right lower lobe.  Endobronchial brushings

were performed under fluoroscopic guidance.  A BAL was then obtained from both the

lateral and posterior segments of the right lower lobe.  Endobronchial biopsies were

then obtained from the same segments.  Hemostasis was verified, and the bronchoscope

was subsequently removed from the patient.  Postprocedure fluoroscopy did not

demonstrate a pneumothorax. 



FINDINGS:  

1. No obvious endobronchial disease was identified.  Secretions were minimal.

2. Dried blood was emanating from the lateral basal and posterobasal segments of the

right lower lobe. 



SPECIMENS OBTAINED:  

1. Pathology on BAL, brushings, transbronchial biopsy.

2. Microbiology on BAL.



COMPLICATIONS:  None.



ESTIMATED BLOOD LOSS:  5 mL.



FLUOROSCOPY TIME:  3.5 minutes.



DISPOSITION:  The patient will transition back to the floor when she recovers from

anesthesia. 







Job ID:  [id number]

## 2019-11-27 NOTE — PRG
DATE OF SERVICE:  11/27/2019



Ms. Kwong had a colonoscopy today that showed internal-external hemorrhoids.  This

is a source of her rectal bleeding.  I have recommended conservative therapy with

fiber and steroid suppository.  For history of ongoing hemorrhage, elective

hemorrhoidectomy would be the consideration.  At this time, we will sign off.  If I

can be of any further assistance, please do not hesitate to contact me. 







Job ID:  [id number]

## 2019-11-27 NOTE — OP
DATE OF PROCEDURE:  11/27/2019



PREPROCEDURE DIAGNOSIS:  Bright red rectal bleeding.



POSTPROCEDURE DIAGNOSIS:  Internal and external hemorrhoids.



ANESTHESIA:  TIVA.



RECOMMENDATIONS:  

1. Daily fiber supplementation and Anusol suppositories.

2. Consider repeat colonoscopy for screening purposes in 10 years depending on

health at that time. 



PROCEDURE IN DETAIL:  After the patient was informed of the risks, benefits, and

possible complications of endoscopy including perforation, bleeding, reaction to

medication, and aspiration, informed consent was obtained.  The patient was brought

to endoscopy suite, where she was sedated in gradual fashion.  Once she was

comfortable, rectal exam was performed.  The endoscope was inserted into the anal

canal through the colon to the cecum, which was identified by the ileocecal valve

and appendiceal orifice.  The terminal ileum was entered and found to be normal.

The scope was then slowly removed.  There was good visualization of the mucosa with

no mass lesions or AV malformations.  There were no polyps or lesions.  Retroflexed

views revealed internal hemorrhoids.  The scope was removed.  The patient tolerated

the procedure well.  There were no complications. 







Job ID:  [id number]

## 2019-11-27 NOTE — PDOC.HOSPP
- Subjective


Subjective: 





Seen and examined. Patient states that she tolerated bowel prep and did have 

significant stool output. Patient states that she had not eaten for days prior 

to this so there was not much stool to come out. Patient breathing well on room 

air. Denies pain. All questions answered in detail.





- Objective


Vital Signs & Weight: 


 Vital Signs (12 hours)











  Temp Pulse BP BP Pulse Ox


 


 11/27/19 09:00     136/84 


 


 11/27/19 08:00      98


 


 11/27/19 07:33  97.6 F    


 


 11/27/19 05:41    161/82 H  


 


 11/27/19 05:40   91  161/82 H  


 


 11/27/19 03:56  98.0 F    








 Weight











Weight                         115 lb 5 oz











 Most Recent Monitor Data











Heart Rate from ECG            87


 


NIBP                           139/77


 


NIBP BP-Mean                   97


 


Respiration from ECG           19


 


SpO2                           100














I&O: 


 











 11/26/19 11/27/19 11/28/19





 06:59 06:59 06:59


 


Intake Total  2090 


 


Balance  2090 











Result Diagrams: 


 11/28/19 04:11





 11/28/19 04:11


Radiology Reviewed by me: Yes





Hospitalist ROS





- Review of Systems


All other systems reviewed; all pertinent +/- noted in HPI/Subj





- Medication


Medications: 


Active Medications











Generic Name Dose Route Start Last Admin





  Trade Name Freq  PRN Reason Stop Dose Admin


 


Atorvastatin Calcium  10 mg  11/26/19 21:00  11/26/19 21:03





  Lipitor  PO   10 mg





  HS JOANIE   Administration





     





     





     





     


 


Carvedilol  12.5 mg  11/26/19 21:00  11/27/19 05:41





  Coreg  PO   12.5 mg





  BID JOANIE   Administration





     





     





     





     


 


Hydralazine HCl  10 mg  11/26/19 04:49  11/27/19 00:42





  Apresoline  SLOW IVP   10 mg





  Q6H PRN   Administration





  SBP Greater Than 170   





     





     





     


 


Vancomycin HCl 750 mg/ Sodium  250 mls @ 250 mls/hr  11/27/19 06:00  11/27/19 05

:40





  Chloride  IVPB   250 mls





  Q24HR JOANIE   Administration





     





     





     





     


 


Piperacillin Sod/Tazobactam  100 mls @ 200 mls/hr  11/26/19 12:00  11/27/19 13:

20





  Sod 3.375 gm/ Sodium Chloride  IVPB   100 mls





  Q6HR JOANIE   Administration





     





     





     





     


 


Lisinopril  5 mg  11/27/19 09:00  11/27/19 05:40





  Zestril  PO   5 mg





  DAILY JOANIE   Administration





     





     





     





     


 


Prednisone  40 mg  11/27/19 08:00  11/27/19 05:40





  Prednisone  PO  12/01/19 08:01  40 mg





  QAM-WM JOANIE   Administration





     





     





     





     


 


Sodium Chloride  10 ml  11/26/19 09:00  11/27/19 08:55





  Flush - Normal Saline  IVF   10 ml





  Q12HR JOANIE   Administration





     





     





     





     














- Exam


General Appearance: NAD, awake alert


Eye: PERRL


ENT: normocephalic atraumatic, moist mucosa


Neck: supple, symmetric, no JVD, no lymphadenopathy


Heart: RRR, no murmur, no gallops, no rubs


Respiratory: CTAB, no rales, no ronchi, normal chest expansion, wheezes (Faint 

right lower lung field)


Gastrointestinal: soft, non-tender, no guarding, no rigidity


Extremities: no edema


Skin: no lesions, no rashes


Neurological: cranial nerve grossly intact, no focal deficits


Musculoskeletal: normal strength


Psychiatric: normal affect, A&O x 3





Hosp A/P


(1) Normocytic anemia


Code(s): D64.9 - ANEMIA, UNSPECIFIED   Status: Acute   





(2) Bronchiectasis


Code(s): J47.9 - BRONCHIECTASIS, UNCOMPLICATED   Status: Acute   





(3) Pneumonia


Code(s): J18.9 - PNEUMONIA, UNSPECIFIED ORGANISM   Status: Acute   





(4) CKD (chronic kidney disease) stage 2, GFR 60-89 ml/min


Code(s): N18.2 - CHRONIC KIDNEY DISEASE, STAGE 2 (MILD)   Status: Chronic   





(5) Chronic diastolic heart failure


Code(s): I50.32 - CHRONIC DIASTOLIC (CONGESTIVE) HEART FAILURE   Status: 

Chronic   





(6) Dyslipidemia


Code(s): E78.5 - HYPERLIPIDEMIA, UNSPECIFIED   Status: Chronic   





(7) Hypertension


Code(s): I10 - ESSENTIAL (PRIMARY) HYPERTENSION   Status: Chronic   





(8) Tobacco abuse


Code(s): Z72.0 - TOBACCO USE   Status: Chronic   





- Plan





Plan:


[facility], stable for Med/ tel


gastroenterology consultation, recommendations appreciated


pulmonology consultation, recommendations appreciated


patient with G.I. bleeding, may require endoscopy for definitive diagnosis and 

treatment of G.I. bleeding


status post bowel prep on 11/27/2019


patient would bronchiectasis and cavitary lesion on the right lower lobe seen 

on imaging


Bronchiectasis work up ongoing per Pulm


Continue ABX


blood pressure control


DVT prophylaxis


hold antiplatelet therapy/ anticoagulation in the setting of G.I. bleeding


continue other home medications as able

## 2019-11-28 LAB
ANION GAP SERPL CALC-SCNC: 8 MMOL/L (ref 10–20)
BASOPHILS # BLD AUTO: 0 THOU/UL (ref 0–0.2)
BASOPHILS NFR BLD AUTO: 0.3 % (ref 0–1)
BUN SERPL-MCNC: 11 MG/DL (ref 9.8–20.1)
CALCIUM SERPL-MCNC: 8.6 MG/DL (ref 7.8–10.44)
CHLORIDE SERPL-SCNC: 111 MMOL/L (ref 98–107)
CO2 SERPL-SCNC: 28 MMOL/L (ref 23–31)
CREAT CL PREDICTED SERPL C-G-VRATE: 41 ML/MIN (ref 70–130)
EOSINOPHIL # BLD AUTO: 0.1 THOU/UL (ref 0–0.7)
EOSINOPHIL NFR BLD AUTO: 0.6 % (ref 0–10)
GLUCOSE SERPL-MCNC: 112 MG/DL (ref 80–115)
HGB BLD-MCNC: 10.7 G/DL (ref 12–16)
LYMPHOCYTES # BLD: 1.8 THOU/UL (ref 1.2–3.4)
LYMPHOCYTES NFR BLD AUTO: 16.7 % (ref 21–51)
MCH RBC QN AUTO: 29.1 PG (ref 27–31)
MCV RBC AUTO: 88.3 FL (ref 78–98)
MONOCYTES # BLD AUTO: 0.4 THOU/UL (ref 0.11–0.59)
MONOCYTES NFR BLD AUTO: 4.2 % (ref 0–10)
NEUTROPHILS # BLD AUTO: 8.4 THOU/UL (ref 1.4–6.5)
NEUTROPHILS NFR BLD AUTO: 78.2 % (ref 42–75)
PLATELET # BLD AUTO: 167 THOU/UL (ref 130–400)
POTASSIUM SERPL-SCNC: 4 MMOL/L (ref 3.5–5.1)
RBC # BLD AUTO: 3.69 MILL/UL (ref 4.2–5.4)
SODIUM SERPL-SCNC: 143 MMOL/L (ref 136–145)
VANCOMYCIN TROUGH SERPL-MCNC: 6.1 UG/ML
WBC # BLD AUTO: 10.7 THOU/UL (ref 4.8–10.8)

## 2019-11-28 RX ADMIN — HYDROCORTISONE ACETATE SCH MG: 25 SUPPOSITORY RECTAL at 08:20

## 2019-11-28 RX ADMIN — AMOXICILLIN AND CLAVULANATE POTASSIUM SCH MG: 875; 125 TABLET, FILM COATED ORAL at 21:41

## 2019-11-28 RX ADMIN — HYDROCORTISONE ACETATE SCH MG: 25 SUPPOSITORY RECTAL at 21:42

## 2019-11-28 RX ADMIN — VANCOMYCIN HYDROCHLORIDE SCH: 750 INJECTION, POWDER, LYOPHILIZED, FOR SOLUTION INTRAVENOUS at 17:31

## 2019-11-28 RX ADMIN — VANCOMYCIN HYDROCHLORIDE SCH MLS: 750 INJECTION, POWDER, LYOPHILIZED, FOR SOLUTION INTRAVENOUS at 06:08

## 2019-11-28 RX ADMIN — METHYLCELLULOSE SCH MG: 500 TABLET ORAL at 08:29

## 2019-11-28 RX ADMIN — Medication SCH ML: at 21:42

## 2019-11-28 RX ADMIN — Medication SCH ML: at 08:20

## 2019-11-28 NOTE — PRG
DATE OF SERVICE:  11/28/2019



SUBJECTIVE:  Hermelinda Kwong has no complaints.  She denies coughing up any more blood. 



She is still on IV Zosyn. 



I have recommended that we switch her to p.o. medications today. 



She is on room air.



OBJECTIVE:  GENERAL:  She is in no distress. 

VITAL SIGNS:  Blood pressure 157/69, heart rate 75, and respiratory rate is 20.  She

is afebrile. 

LUNGS:  Clear. 

HEART:  Regular rhythm. 

ABDOMEN:  Soft. 



Her bronchoscopy cytology is still pending.



IMPRESSION:  

1. Hemoptysis.

2. History of bronchiectasis.

3. Probable pneumonia.

4. Cultures have been reviewed.  Acid-fast bacilli smears are negative.  Respiratory

cultures from bronchial washings are showing no new pathogens, but multiple

organisms on Gram stain.  We will switch her to Mo Augmentin.  Hopefully, we will

have cytology back by tomorrow. 







Job ID:  [id number]

## 2019-11-28 NOTE — PDOC.HOSPP
- Subjective


Subjective: 





Seen and examined. Clinically improving on maximal medical therapy. Afebrile. 

Patient responding to IV antibiotics. Patient tolerated colonoscopy and 

bronchoscopy  please see for operative reports for details.





- Objective


Vital Signs & Weight: 


 Vital Signs (12 hours)











  Temp Pulse Resp BP BP Pulse Ox


 


 11/28/19 11:17  97.9 F  75  20   157/69 H  98


 


 11/28/19 10:56      142/66 H 


 


 11/28/19 08:33   78   189/87 H  


 


 11/28/19 08:31   78    


 


 11/28/19 08:19     189/87 H  


 


 11/28/19 08:00  98 F  78  16   189/87 H  100


 


 11/28/19 05:42   78  18   159/70 H 


 


 11/28/19 04:14   83   177/88 H  


 


 11/28/19 03:20  97.8 F  75  24 H   177/74 H  100








 Weight











Weight                         115 lb 5 oz











 Most Recent Monitor Data











Heart Rate from ECG            81


 


NIBP                           149/61


 


NIBP BP-Mean                   90


 


Respiration from ECG           18


 


SpO2                           99














I&O: 


 











 11/27/19 11/28/19 11/29/19





 06:59 06:59 06:59


 


Intake Total 2090 1260 120


 


Balance 2090 1260 120











Result Diagrams: 


 11/28/19 04:11





 11/28/19 04:11


Radiology Reviewed by me: Yes





Hospitalist ROS





- Review of Systems


All other systems reviewed; all pertinent +/- noted in HPI/Subj





- Medication


Medications: 


Active Medications











Generic Name Dose Route Start Last Admin





  Trade Name Freq  PRN Reason Stop Dose Admin


 


Atorvastatin Calcium  10 mg  11/26/19 21:00  11/27/19 20:18





  Lipitor  PO   10 mg





  HS JOANIE   Administration





     





     





     





     


 


Carvedilol  12.5 mg  11/26/19 21:00  11/28/19 08:19





  Coreg  PO   12.5 mg





  BID JOANIE   Administration





     





     





     





     


 


Hydralazine HCl  10 mg  11/26/19 04:49  11/28/19 04:14





  Apresoline  SLOW IVP   10 mg





  Q6H PRN   Administration





  SBP Greater Than 170   





     





     





     


 


Hydrocortisone Acetate  25 mg  11/27/19 21:00  11/28/19 08:20





  Anusol-Hc  LA   25 mg





  BID JOANIE   Administration





     





     





     





     


 


Piperacillin Sod/Tazobactam  100 mls @ 200 mls/hr  11/26/19 12:00  11/28/19 12:

23





  Sod 3.375 gm/ Sodium Chloride  IVPB   100 mls





  Q6HR JOANIE   Administration





     





     





     





     


 


Vancomycin HCl 750 mg/ Sodium  250 mls @ 250 mls/hr  11/28/19 06:00  11/28/19 06

:08





  Chloride  IVPB   250 mls





  0600,1800 JOANIE   Administration





     





     





     





     


 


Lisinopril  10 mg  11/28/19 09:00  11/28/19 08:33





  Zestril  PO   10 mg





  DAILY JOANIE   Administration





     





     





     





     


 


Methylcellulose  500 mg  11/28/19 09:00  11/28/19 08:29





  Citrucel  PO   500 mg





  DAILY JOANIE   Administration





     





     





     





     


 


Prednisone  40 mg  11/27/19 08:00  11/28/19 08:19





  Prednisone  PO  12/01/19 08:01  40 mg





  QAM-WM JOANIE   Administration





     





     





     





     


 


Sodium Chloride  10 ml  11/26/19 09:00  11/28/19 08:20





  Flush - Normal Saline  IVF   10 ml





  Q12HR JOANIE   Administration





     





     





     





     














- Exam


General Appearance: NAD, awake alert


Eye: anicteric sclera


ENT: normocephalic atraumatic, moist mucosa


Neck: supple, symmetric, no lymphadenopathy


Heart: no murmur, no gallops, no rubs


Respiratory: no rales, no ronchi, normal chest expansion, wheezes (Right lower 

lobe wheezing is mild)


Gastrointestinal: soft, non-tender, non-distended, no guarding, no rigidity


Extremities: no edema


Skin: no lesions, no rashes


Neurological: cranial nerve grossly intact, no focal deficits


Musculoskeletal: normal strength, no muscle wasting


Psychiatric: normal affect, A&O x 3





Hosp A/P


(1) Normocytic anemia


Code(s): D64.9 - ANEMIA, UNSPECIFIED   Status: Acute   





(2) Bronchiectasis


Code(s): J47.9 - BRONCHIECTASIS, UNCOMPLICATED   Status: Acute   





(3) Pneumonia


Code(s): J18.9 - PNEUMONIA, UNSPECIFIED ORGANISM   Status: Acute   





(4) CKD (chronic kidney disease) stage 2, GFR 60-89 ml/min


Code(s): N18.2 - CHRONIC KIDNEY DISEASE, STAGE 2 (MILD)   Status: Chronic   





(5) Chronic diastolic heart failure


Code(s): I50.32 - CHRONIC DIASTOLIC (CONGESTIVE) HEART FAILURE   Status: 

Chronic   





(6) Dyslipidemia


Code(s): E78.5 - HYPERLIPIDEMIA, UNSPECIFIED   Status: Chronic   





(7) Hypertension


Code(s): I10 - ESSENTIAL (PRIMARY) HYPERTENSION   Status: Chronic   





(8) Tobacco abuse


Code(s): Z72.0 - TOBACCO USE   Status: Chronic   





- Plan





Plan:


[facility], stable for Med/ tel


gastroenterology consultation, recommendations appreciated


pulmonology consultation, recommendations appreciated


S/p C-scope on 11/27 - see full report for details - internal and external 

hemorrhoids as cause of bleeding


S/p Bronchoscopy on 11/27 -  see full report for details 


patient would bronchiectasis and cavitary lesion on the right lower lobe seen 

on imaging


Continue ABX, de escalate as able per pulm


blood pressure control


DVT prophylaxis


Restart antiplatelet therapy/ anticoagulation - not true G.I. bleeding with 

hemorrhoids


continue other home medications as able

## 2019-11-29 LAB
ANION GAP SERPL CALC-SCNC: 10 MMOL/L (ref 10–20)
BASOPHILS # BLD AUTO: 0 THOU/UL (ref 0–0.2)
BASOPHILS NFR BLD AUTO: 0.4 % (ref 0–1)
BUN SERPL-MCNC: 10 MG/DL (ref 9.8–20.1)
CALCIUM SERPL-MCNC: 8.7 MG/DL (ref 7.8–10.44)
CCP IGG SERPL-ACNC: 118 ELIAU/ML
CHLORIDE SERPL-SCNC: 111 MMOL/L (ref 98–107)
CO2 SERPL-SCNC: 23 MMOL/L (ref 23–31)
CREAT CL PREDICTED SERPL C-G-VRATE: 53 ML/MIN (ref 70–130)
ELIA RAS NEW METHOD: (no result)
EOSINOPHIL # BLD AUTO: 0.1 THOU/UL (ref 0–0.7)
EOSINOPHIL NFR BLD AUTO: 0.6 % (ref 0–10)
GLUCOSE SERPL-MCNC: 108 MG/DL (ref 80–115)
HGB BLD-MCNC: 10.9 G/DL (ref 12–16)
LYMPHOCYTES # BLD: 2.1 THOU/UL (ref 1.2–3.4)
LYMPHOCYTES NFR BLD AUTO: 20.8 % (ref 21–51)
MCH RBC QN AUTO: 28.7 PG (ref 27–31)
MCV RBC AUTO: 88.3 FL (ref 78–98)
MONOCYTES # BLD AUTO: 0.7 THOU/UL (ref 0.11–0.59)
MONOCYTES NFR BLD AUTO: 7 % (ref 0–10)
NEUTROPHILS # BLD AUTO: 7.3 THOU/UL (ref 1.4–6.5)
NEUTROPHILS NFR BLD AUTO: 71.2 % (ref 42–75)
PLATELET # BLD AUTO: 169 THOU/UL (ref 130–400)
POTASSIUM SERPL-SCNC: 3 MMOL/L (ref 3.5–5.1)
RA ELIA INTERPRETATION: (no result)
RBC # BLD AUTO: 3.8 MILL/UL (ref 4.2–5.4)
RF IGA SER-ACNC: 4.4 IU/ML
RF IGM SER IA-ACNC: 0.6 IU/ML
SODIUM SERPL-SCNC: 141 MMOL/L (ref 136–145)
WBC # BLD AUTO: 10.3 THOU/UL (ref 4.8–10.8)

## 2019-11-29 RX ADMIN — Medication SCH ML: at 21:21

## 2019-11-29 RX ADMIN — HYDROCORTISONE ACETATE SCH MG: 25 SUPPOSITORY RECTAL at 09:42

## 2019-11-29 RX ADMIN — Medication SCH ML: at 09:42

## 2019-11-29 RX ADMIN — AMOXICILLIN AND CLAVULANATE POTASSIUM SCH MG: 875; 125 TABLET, FILM COATED ORAL at 21:19

## 2019-11-29 RX ADMIN — METHYLCELLULOSE SCH MG: 500 TABLET ORAL at 10:07

## 2019-11-29 RX ADMIN — AMOXICILLIN AND CLAVULANATE POTASSIUM SCH MG: 875; 125 TABLET, FILM COATED ORAL at 09:40

## 2019-11-29 RX ADMIN — HYDROCORTISONE ACETATE SCH MG: 25 SUPPOSITORY RECTAL at 21:21

## 2019-11-29 RX ADMIN — ASPIRIN SCH MG: 81 TABLET ORAL at 09:41

## 2019-11-29 NOTE — PDOC.HOSPP
- Subjective


Subjective: 





Seen and examined. Breathing well on room air. Transition to oral antibiotics. 

All questions answered in detail. Patient is happy with plan of care. Discuss 

the importance of probiotic therapy with antibiotics and the possibility that 

she may develop loose stool.





- Objective


Vital Signs & Weight: 


 Vital Signs (12 hours)











  Temp Pulse Resp BP BP Pulse Ox


 


 11/29/19 11:24  98.4 F  72  20   162/71 H  100


 


 11/29/19 09:40  97.9 F  78  18   182/77 H  99


 


 11/29/19 04:04   64   190/92 H  


 


 11/29/19 03:42  97.5 F L  70  14   199/84 H  100








 Weight











Weight                         115 lb 5 oz











 Most Recent Monitor Data











Heart Rate from ECG            81


 


NIBP                           149/61


 


NIBP BP-Mean                   90


 


Respiration from ECG           18


 


SpO2                           99














I&O: 


 











 11/28/19 11/29/19 11/30/19





 06:59 06:59 06:59


 


Intake Total 1260 1875 


 


Balance 1260 1875 











Result Diagrams: 


 11/29/19 04:17





 11/29/19 04:17


Radiology Reviewed by me: Yes





Hospitalist ROS





- Review of Systems


All other systems reviewed; all pertinent +/- noted in HPI/Subj





- Medication


Medications: 


Active Medications











Generic Name Dose Route Start Last Admin





  Trade Name Freq  PRN Reason Stop Dose Admin


 


Amoxicillin/Clavulanate Potassium  875 mg  11/28/19 21:00  11/29/19 09:40





  Augmentin  PO   875 mg





  Q12HR JOANIE   Administration





     





     





     





     


 


Aspirin  81 mg  11/29/19 09:00  11/29/19 09:41





  Ecotrin  PO   81 mg





  DAILY JOANIE   Administration





     





     





     





     


 


Atorvastatin Calcium  10 mg  11/26/19 21:00  11/28/19 21:41





  Lipitor  PO   10 mg





  HS JOANIE   Administration





     





     





     





     


 


Carvedilol  12.5 mg  11/26/19 21:00  11/29/19 09:41





  Coreg  PO   12.5 mg





  BID JOANIE   Administration





     





     





     





     


 


Clopidogrel Bisulfate  75 mg  11/29/19 09:00  11/29/19 09:41





  Plavix  PO   75 mg





  DAILY JOANIE   Administration





     





     





     





     


 


Hydralazine HCl  10 mg  11/26/19 04:49  11/29/19 04:04





  Apresoline  SLOW IVP   10 mg





  Q6H PRN   Administration





  SBP Greater Than 170   





     





     





     


 


Hydrocortisone Acetate  25 mg  11/27/19 21:00  11/29/19 09:42





  Anusol-Hc  VT   25 mg





  BID JOANIE   Administration





     





     





     





     


 


Lisinopril  10 mg  11/28/19 09:00  11/29/19 09:40





  Zestril  PO   10 mg





  DAILY JOANIE   Administration





     





     





     





     


 


Methylcellulose  500 mg  11/28/19 09:00  11/29/19 10:07





  Citrucel  PO   500 mg





  DAILY JOANIE   Administration





     





     





     





     


 


Prednisone  40 mg  11/27/19 08:00  11/29/19 09:40





  Prednisone  PO  12/01/19 08:01  40 mg





  QAM-WM JOANIE   Administration





     





     





     





     


 


Sodium Chloride  10 ml  11/26/19 09:00  11/29/19 09:42





  Flush - Normal Saline  IVF   10 ml





  Q12HR JOANIE   Administration





     





     





     





     














- Exam


General Appearance: NAD, awake alert


Eye: PERRL


ENT: normocephalic atraumatic, moist mucosa


Neck: supple, symmetric, no JVD, no thyromegaly, no lymphadenopathy


Heart: no murmur, no gallops, no rubs


Respiratory: CTAB, no wheezes, no rales, no ronchi


Gastrointestinal: soft, normal bowel sounds, no guarding, no rigidity


Extremities: no edema


Skin: no lesions, no rashes


Neurological: cranial nerve grossly intact, no weakness, no focal deficits


Musculoskeletal: normal strength


Psychiatric: normal affect, A&O x 3





Hosp A/P


(1) Normocytic anemia


Code(s): D64.9 - ANEMIA, UNSPECIFIED   Status: Acute   





(2) Bronchiectasis


Code(s): J47.9 - BRONCHIECTASIS, UNCOMPLICATED   Status: Acute   





(3) Pneumonia


Code(s): J18.9 - PNEUMONIA, UNSPECIFIED ORGANISM   Status: Acute   





(4) CKD (chronic kidney disease) stage 2, GFR 60-89 ml/min


Code(s): N18.2 - CHRONIC KIDNEY DISEASE, STAGE 2 (MILD)   Status: Chronic   





(5) Chronic diastolic heart failure


Code(s): I50.32 - CHRONIC DIASTOLIC (CONGESTIVE) HEART FAILURE   Status: 

Chronic   





(6) Dyslipidemia


Code(s): E78.5 - HYPERLIPIDEMIA, UNSPECIFIED   Status: Chronic   





(7) Hypertension


Code(s): I10 - ESSENTIAL (PRIMARY) HYPERTENSION   Status: Chronic   





(8) Tobacco abuse


Code(s): Z72.0 - TOBACCO USE   Status: Chronic   





- Plan





Plan:


[facility], stable for Med/ tel


gastroenterology consultation, recommendations appreciated


pulmonology consultation, recommendations appreciated


S/p C-scope on 11/27 - see full report for details - internal and external 

hemorrhoids as cause of bleeding


S/p Bronchoscopy on 11/27 -  see full report for details 


patient would bronchiectasis and cavitary lesion on the right lower lobe seen 

on imaging


Continue ABX


Probiotic therapy


Will need outpatient follow up with Pulmonology in the upcoming weeks


blood pressure control


DVT prophylaxis


Restart antiplatelet therapy/ anticoagulation - not true G.I. bleeding with 

hemorrhoids


continue other home medications as able

## 2019-11-29 NOTE — PRG
DATE OF SERVICE:  11/29/2019



SUBJECTIVE:  Ms. Kwong feels better and she wants to go home.



OBJECTIVE:  VITAL SIGNS:  Temperature is 98.4, pulse rate 72, respirations are 20,

O2 saturation 100% on room air, and blood pressure 162/71. 

HEENT:  Unremarkable. 

NECK:  No adenopathy or JVD. 

LUNGS:  Clear without wheezing or rhonchi. 

CARDIAC:  S1 and S2, regular. 

ABDOMEN:  Soft. 

EXTREMITIES:  No edema.



LABORATORY DATA:  White blood cell count 10.3, hematocrit 33.6, and platelet count

169.  Sodium 141, potassium 3.0, chloride 111, CO2 of 23, BUN 10, creatinine 0.8,

and glucose 103. 



ASSESSMENT:  

1. Hemoptysis, resolved.

2. History of bronchiectasis.

3. Probable pneumonia.



PLAN:  This patient is ready for hospital discharge.  She can go home at any time on

a total of 10 days of antibiotics between hospital and home.  She needs to follow

with Dr. Bolton as an outpatient.  Hopefully, pathology results will be back by the

time she follows up in the office. 







Job ID:  [id number]

## 2019-11-30 VITALS — TEMPERATURE: 98.3 F

## 2019-11-30 VITALS — DIASTOLIC BLOOD PRESSURE: 74 MMHG | SYSTOLIC BLOOD PRESSURE: 149 MMHG

## 2019-11-30 RX ADMIN — AMOXICILLIN AND CLAVULANATE POTASSIUM SCH MG: 875; 125 TABLET, FILM COATED ORAL at 08:02

## 2019-11-30 RX ADMIN — ASPIRIN SCH MG: 81 TABLET ORAL at 08:02

## 2019-11-30 RX ADMIN — HYDROCORTISONE ACETATE SCH MG: 25 SUPPOSITORY RECTAL at 08:01

## 2019-11-30 RX ADMIN — METHYLCELLULOSE SCH MG: 500 TABLET ORAL at 08:02

## 2019-11-30 RX ADMIN — Medication SCH ML: at 09:02

## 2019-11-30 NOTE — DIS
DATE OF ADMISSION:  11/26/2019



DATE OF DISCHARGE:  11/30/2019



REASON FOR HOSPITALIZATION:  Blood in stool and shortness of breath.



SIGNIFICANT FINDINGS:  The patient was found to have internal and external

hemorrhoids in addition to bronchiectasis. 



PROCEDURES PERFORMED AND TREATMENTS RENDERED:  The patient was admitted to

intermediate [facility] care floor and had maximum medical therapy and was seen by

Pulmonology and Gastroenterology - please see full consultation, progress notes,

operative reports, and history and physical for full details.  With maximum medical

therapy, the patient did improve and was recommended safe for discharge by all

specialists on 11/30/2019.  The patient did undergo colonoscopy on 11/27/2019 -

please see full operative report for details - the patient was identified to have

internal and external hemorrhoids as a source of GI bleeding.  The patient also

underwent a bronchoscopy, which was performed on 11/27/2019, in coordination with

colonoscopy - please see full operative report from bronchoscopy for details.  The

patient with bronchial washings, which did demonstrate Haemophilus influenzae.  The

patient was titrated on antibiotics appropriately by Pulmonology and recommended

safe for discharge on 11/30/2019. 



CONDITION ON DISCHARGE:  Stable.



SPECIFIC INSTRUCTIONS FOR THE PATIENT/FAMILY:  

1. The patient is recommended to take a full course of oral antibiotics per

pulmonologist. 

2. The patient is recommended to take all other medications as directed, to be

re-evaluated by primary care physician, Pulmonology, and Gastroenterology in the

outpatient setting in the upcoming weeks. 

3. The patient is recommended to follow up with primary care physician in the next 5

to 7 days. 

4. The patient is recommended to follow up with Pulmonology in the next 2 to 3 weeks.

5. The patient is recommended to follow up with Gastroenterology in the next 3 to 4

weeks. 

6. The patient is recommended to return to acute care hospital immediately if signs

or symptoms return, worsen, or any other new symptoms occur. 



DISCHARGE MEDICATIONS:  Please see full discharge medication list for details.

1. Aspirin 81 mg 1 tablet p.o. daily.

2. Plavix 75 mg 1 tablet p.o. daily.

3. Pravastatin 40 mg 1 tablet p.o. at bedtime.

4. Methylprednisolone (Medrol Dosepak). 

4 mg - use as directed.

1. Amoxicillin/clavulanic acid (Augmentin) 875 mg 1 tablet p.o. b.i.d., for a full

10-day course. 

2. Lisinopril 10 mg 1 tablet p.o. b.i.d.

3. Lactobacillus 1 tablet p.o. daily.

4. Hydrocortisone acetate (Anusol) hemorrhoid cream 25 mg per rectum b.i.d.

5. Cellothyl (Citrucel tablet) 500 mg 1 tablet p.o. daily.

6. Carvedilol 25 mg 1 tablet p.o. b.i.d.

7. Amlodipine 5 mg 1 tablet p.o. daily.

8. Acetaminophen 650 mg p.o. q.4 hours p.r.n. pain or fever.



TIME SPENT:  Greater than 39 minutes spent coordinating care and discharge process

for this patient. 







Job ID:  [id number]

## 2019-12-02 NOTE — PQF
[employer] Winform ViewerMOFELIPA,JUSTICE                  
                          AWILDA DEAN

[id number]                                                             

[id number]                             

                                   

CLINICAL DOCUMENTATION CLARIFICATION FORM:  POST DISCHARGE



Addendum to original discharge summary date:  __________________________________
____



Late entry note date:  _________________________________________________________
__











DATE:  12/02/2019                       ATTN:AWILDA DEAN





Please exercise your independent, professional judgment in responding to the 
clarification form. 

Clinical indicators are provided on the bottom of this form for your review



Please check appropriate box(s):

[ XX ]  Pneumonia with Pulmonary Abscess

[  ]  Pneumonia without Pulmonary Abscess

[  ] Other diagnosis ___________

[  ] Unable to determine



In addition, please specify:

Present on Admission (POA):  [ XX ] Yes             [  ] No             [  ] 
Unable to determine



For continuity of documentation, please document condition throughout progress 
notes and discharge summary.  Thank You.





CLINICAL INDICATORS - SIGNS / SYMPTOMS / LABS

Pulmonary Abscess - Documented in Emergency Notes pg#9

coughing up blood last 2 days- Documented in H&P on 11/26 by Clay Daniel

Pneumonia -Documented in H&P on 11/26 by Clay Daniel

X ray showing RT lower lobe consolidation and area of necrosis cavitation, 
consistent with Pneumonia - Documented in Consult note on 11/26 by Makenzie Alvarez

Patient would bronchiectasis and cavitary lesion - Documented in Hospital PNs 
on 11/29 by Gerson Rawls

Probable Pneumonia - Documented in PNs on 11/28 by Noemi Stanton



RISK FACTORS

Chronic diastolic CHF

CAD

HTN

GI bleed and Hemoptysis





TREATMENTS:

Chest X ray

Vancomycin 1gm IVPB - Documented in Medication

Bronchoscopy BAL , Brushing , Biopsy

[employer] Crystal Reports Winform ViewerPulmonology consult

Continue ABX











(This form is maintained as a part of the permanent medical record)

2015 [email or web address]  All Rights Reserved

[email or web address]    [not 
provided]

                                                              



 



MTDD

## 2019-12-03 LAB
MYELOPEROXIDASE AB SER IA-ACNC: <9 U/ML (ref 0–9)
PROTEINASE3 AB SER IA-ACNC: 8.3 U/ML (ref 0–3.5)

## 2019-12-09 NOTE — PQF
[employer] Winform ViewerMOFELIPA,JUSTICE                  
                          BERTHA BURLESON

[id number]                                                             

[id number]                             

                                   

CLINICAL DOCUMENTATION CLARIFICATION FORM:  POST DISCHARGE



Addendum to original discharge summary date:  __________________________________
____



Late entry note date:  _________________________________________________________
__











DATE:   12/09/2019                                      ATTN: BERTHA BURLESON



Please exercise your independent, professional judgment in responding to the 
clarification form. 

Clinical indicators are provided on the bottom of this form for your review



Procedure: BRONCHOSCOPY



[ X ] Right Lower Lobe Lung

[  ] Right Lower Lobe Bronchus

[  ] Other Body Part _____________________________________ 



CLINICAL INDICATORS - SIGNS/ SYMPTOMS / LABS

The Bronchoscope was advanced to the trachea  - Documented in Operative report 
on 11/27 by BERTHA BURLESON

There are multiple segment coming out from RT lower lobe-  Documented in 
Operative report on 11/27 by BERTHA BURLESON

Some old blood was in the lateral and posterior segments -  Documented in 
Operative report on 11/27 by BERTHA BURLESON

Endobronchial Biopsies were then obtained from segments -  Documented in 
Operative report on 11/27 by BERTHA BURLESON

Endobronchial Brushing were performed under fluoroscopic guidance  -  
Documented in Operative report on 11/27 by BERTHA BURLESON



RISK FACTORS

Pulmonary cavity/infiltrate -  Documented in Operative report on 11/27 by 
BERTHA BURLESON

Bronchiectasis  - Documented in DS on 11/30 by Gerson Rawls



TREATMENT

Hemostasis verified and the bronchoscopy did not demonstrate a pneumothorax  -  
Documented in Operative report on 11/27 by BERTHA BURLESON

Pathology on BAL ,brushing, transbronchial biopsy 













(This form is maintained as a part of the permanent medical record)

2015 [employer]  All Rights Reserved

[email or web address]    [not 
provided]



MTDD

## 2021-11-15 ENCOUNTER — HOSPITAL ENCOUNTER (EMERGENCY)
Dept: HOSPITAL 92 - ERS | Age: 71
LOS: 1 days | Discharge: HOME | End: 2021-11-16
Payer: MEDICARE

## 2021-11-15 DIAGNOSIS — F17.210: ICD-10-CM

## 2021-11-15 DIAGNOSIS — Z79.02: ICD-10-CM

## 2021-11-15 DIAGNOSIS — R04.2: Primary | ICD-10-CM

## 2021-11-15 DIAGNOSIS — I10: ICD-10-CM

## 2021-11-15 DIAGNOSIS — Z79.899: ICD-10-CM

## 2021-11-15 DIAGNOSIS — Z79.82: ICD-10-CM

## 2021-11-15 DIAGNOSIS — E78.5: ICD-10-CM

## 2021-11-15 DIAGNOSIS — I25.2: ICD-10-CM

## 2021-11-15 LAB
ALBUMIN SERPL BCG-MCNC: 4.3 G/DL (ref 3.4–4.8)
ALP SERPL-CCNC: 160 U/L (ref 40–110)
ALT SERPL W P-5'-P-CCNC: 19 U/L (ref 8–55)
ANION GAP SERPL CALC-SCNC: 15 MMOL/L (ref 10–20)
AST SERPL-CCNC: 19 U/L (ref 5–34)
BASOPHILS # BLD AUTO: 0 THOU/UL (ref 0–0.2)
BASOPHILS NFR BLD AUTO: 0.9 % (ref 0–1)
BILIRUB SERPL-MCNC: 0.2 MG/DL (ref 0.2–1.2)
BUN SERPL-MCNC: 12 MG/DL (ref 9.8–20.1)
CALCIUM SERPL-MCNC: 9.8 MG/DL (ref 7.8–10.44)
CHLORIDE SERPL-SCNC: 106 MMOL/L (ref 98–107)
CO2 SERPL-SCNC: 21 MMOL/L (ref 23–31)
CREAT CL PREDICTED SERPL C-G-VRATE: 0 ML/MIN (ref 70–130)
EOSINOPHIL # BLD AUTO: 0.2 THOU/UL (ref 0–0.7)
EOSINOPHIL NFR BLD AUTO: 4 % (ref 0–10)
GLOBULIN SER CALC-MCNC: 3.5 G/DL (ref 2.4–3.5)
GLUCOSE SERPL-MCNC: 119 MG/DL (ref 80–115)
HGB BLD-MCNC: 15.5 G/DL (ref 12–16)
LYMPHOCYTES # BLD: 1.7 THOU/UL (ref 1.2–3.4)
LYMPHOCYTES NFR BLD AUTO: 34.5 % (ref 21–51)
MCH RBC QN AUTO: 29.5 PG (ref 27–31)
MCV RBC AUTO: 89.2 FL (ref 78–98)
MONOCYTES # BLD AUTO: 0.2 THOU/UL (ref 0.11–0.59)
MONOCYTES NFR BLD AUTO: 5 % (ref 0–10)
NEUTROPHILS # BLD AUTO: 2.7 THOU/UL (ref 1.4–6.5)
NEUTROPHILS NFR BLD AUTO: 55.7 % (ref 42–75)
PLATELET # BLD AUTO: 181 THOU/UL (ref 130–400)
POTASSIUM SERPL-SCNC: 4.1 MMOL/L (ref 3.5–5.1)
RBC # BLD AUTO: 5.25 MILL/UL (ref 4.2–5.4)
SODIUM SERPL-SCNC: 138 MMOL/L (ref 136–145)
WBC # BLD AUTO: 4.8 THOU/UL (ref 4.8–10.8)

## 2021-11-15 PROCEDURE — 80053 COMPREHEN METABOLIC PANEL: CPT

## 2021-11-15 PROCEDURE — 71045 X-RAY EXAM CHEST 1 VIEW: CPT

## 2021-11-15 PROCEDURE — 85025 COMPLETE CBC W/AUTO DIFF WBC: CPT

## 2021-11-15 PROCEDURE — 83880 ASSAY OF NATRIURETIC PEPTIDE: CPT

## 2021-11-15 PROCEDURE — 36415 COLL VENOUS BLD VENIPUNCTURE: CPT

## 2021-11-15 PROCEDURE — 84484 ASSAY OF TROPONIN QUANT: CPT

## 2021-11-15 PROCEDURE — 93005 ELECTROCARDIOGRAM TRACING: CPT

## 2021-11-16 ENCOUNTER — HOSPITAL ENCOUNTER (INPATIENT)
Dept: HOSPITAL 92 - ERS | Age: 71
LOS: 1 days | Discharge: HOME | DRG: 204 | End: 2021-11-17
Attending: FAMILY MEDICINE | Admitting: FAMILY MEDICINE
Payer: MEDICARE

## 2021-11-16 VITALS — BODY MASS INDEX: 24 KG/M2

## 2021-11-16 DIAGNOSIS — J45.909: ICD-10-CM

## 2021-11-16 DIAGNOSIS — Z20.822: ICD-10-CM

## 2021-11-16 DIAGNOSIS — N18.30: ICD-10-CM

## 2021-11-16 DIAGNOSIS — Z90.710: ICD-10-CM

## 2021-11-16 DIAGNOSIS — E78.5: ICD-10-CM

## 2021-11-16 DIAGNOSIS — Z79.01: ICD-10-CM

## 2021-11-16 DIAGNOSIS — F17.210: ICD-10-CM

## 2021-11-16 DIAGNOSIS — R04.2: Primary | ICD-10-CM

## 2021-11-16 DIAGNOSIS — Z71.6: ICD-10-CM

## 2021-11-16 DIAGNOSIS — R65.10: ICD-10-CM

## 2021-11-16 DIAGNOSIS — I25.2: ICD-10-CM

## 2021-11-16 DIAGNOSIS — Z79.899: ICD-10-CM

## 2021-11-16 DIAGNOSIS — Z95.1: ICD-10-CM

## 2021-11-16 DIAGNOSIS — I25.10: ICD-10-CM

## 2021-11-16 DIAGNOSIS — I12.9: ICD-10-CM

## 2021-11-16 LAB
ALBUMIN SERPL BCG-MCNC: 4.2 G/DL (ref 3.4–4.8)
ALP SERPL-CCNC: 156 U/L (ref 40–110)
ALT SERPL W P-5'-P-CCNC: 18 U/L (ref 8–55)
ANALYZER IN CARDIO: (no result)
ANION GAP SERPL CALC-SCNC: 14 MMOL/L (ref 10–20)
APTT PPP: 29.8 SEC (ref 22.9–36.1)
AST SERPL-CCNC: 16 U/L (ref 5–34)
BASE EXCESS STD BLDV CALC-SCNC: -4.5 MEQ/L
BASOPHILS # BLD AUTO: 0 THOU/UL (ref 0–0.2)
BASOPHILS NFR BLD AUTO: 0.4 % (ref 0–1)
BILIRUB SERPL-MCNC: 0.5 MG/DL (ref 0.2–1.2)
BUN SERPL-MCNC: 13 MG/DL (ref 9.8–20.1)
CA-I BLDV-MCNC: 1.12 MMOL/L (ref 1.16–1.32)
CALCIUM SERPL-MCNC: 9.4 MG/DL (ref 7.8–10.44)
CHLORIDE BLDV-SCNC: 103 MMOL/L (ref 98–106)
CHLORIDE SERPL-SCNC: 103 MMOL/L (ref 98–107)
CK SERPL-CCNC: 92 U/L (ref 29–168)
CO2 SERPL-SCNC: 23 MMOL/L (ref 23–31)
CREAT CL PREDICTED SERPL C-G-VRATE: 0 ML/MIN (ref 70–130)
CRP SERPL-MCNC: 2.86 MG/DL
DRUG SCREEN CUTOFF: (no result)
EOSINOPHIL # BLD AUTO: 0.2 THOU/UL (ref 0–0.7)
EOSINOPHIL NFR BLD AUTO: 2.2 % (ref 0–10)
GLOBULIN SER CALC-MCNC: 3.3 G/DL (ref 2.4–3.5)
GLUCOSE SERPL-MCNC: 143 MG/DL (ref 80–115)
HCO3 BLDV-SCNC: 22 MEQ/L (ref 22–28)
HCT VFR BLDV CALC: 45 % (ref 36–47)
HGB BLD-MCNC: 14.9 G/DL (ref 12–16)
HGB BLDV-MCNC: 15.4 G/DL (ref 11.7–16.1)
HIV 1/2 INDEX: 0.15 S/CO (ref ?–1)
INR PPP: 1.1
LIPASE SERPL-CCNC: 32 U/L (ref 8–78)
LYMPHOCYTES # BLD: 1.7 THOU/UL (ref 1.2–3.4)
LYMPHOCYTES NFR BLD AUTO: 18.5 % (ref 21–51)
MAGNESIUM SERPL-MCNC: 2.1 MG/DL (ref 1.6–2.6)
MCH RBC QN AUTO: 29.4 PG (ref 27–31)
MCV RBC AUTO: 89 FL (ref 78–98)
MONOCYTES # BLD AUTO: 0.3 THOU/UL (ref 0.11–0.59)
MONOCYTES NFR BLD AUTO: 3.3 % (ref 0–10)
NEUTROPHILS # BLD AUTO: 7 THOU/UL (ref 1.4–6.5)
NEUTROPHILS NFR BLD AUTO: 75.6 % (ref 42–75)
PLATELET # BLD AUTO: 208 THOU/UL (ref 130–400)
POTASSIUM BLDV-SCNC: 4.32 MMOL/L (ref 3.7–5.3)
POTASSIUM SERPL-SCNC: 4.3 MMOL/L (ref 3.5–5.1)
PROTHROMBIN TIME: 13.9 SEC (ref 12–14.7)
RBC # BLD AUTO: 5.08 MILL/UL (ref 4.2–5.4)
SODIUM BLDV-SCNC: 137.1 MMOL/L (ref 133–146)
SODIUM SERPL-SCNC: 136 MMOL/L (ref 136–145)
WBC # BLD AUTO: 9.2 THOU/UL (ref 4.8–10.8)

## 2021-11-16 PROCEDURE — 80306 DRUG TEST PRSMV INSTRMNT: CPT

## 2021-11-16 PROCEDURE — 36415 COLL VENOUS BLD VENIPUNCTURE: CPT

## 2021-11-16 PROCEDURE — 83735 ASSAY OF MAGNESIUM: CPT

## 2021-11-16 PROCEDURE — 83605 ASSAY OF LACTIC ACID: CPT

## 2021-11-16 PROCEDURE — 85025 COMPLETE CBC W/AUTO DIFF WBC: CPT

## 2021-11-16 PROCEDURE — 85610 PROTHROMBIN TIME: CPT

## 2021-11-16 PROCEDURE — 85730 THROMBOPLASTIN TIME PARTIAL: CPT

## 2021-11-16 PROCEDURE — 71045 X-RAY EXAM CHEST 1 VIEW: CPT

## 2021-11-16 PROCEDURE — 82805 BLOOD GASES W/O2 SATURATION: CPT

## 2021-11-16 PROCEDURE — 93005 ELECTROCARDIOGRAM TRACING: CPT

## 2021-11-16 PROCEDURE — 84484 ASSAY OF TROPONIN QUANT: CPT

## 2021-11-16 PROCEDURE — 87040 BLOOD CULTURE FOR BACTERIA: CPT

## 2021-11-16 PROCEDURE — 86140 C-REACTIVE PROTEIN: CPT

## 2021-11-16 PROCEDURE — U0005 INFEC AGEN DETEC AMPLI PROBE: HCPCS

## 2021-11-16 PROCEDURE — 85652 RBC SED RATE AUTOMATED: CPT

## 2021-11-16 PROCEDURE — 80307 DRUG TEST PRSMV CHEM ANLYZR: CPT

## 2021-11-16 PROCEDURE — 71275 CT ANGIOGRAPHY CHEST: CPT

## 2021-11-16 PROCEDURE — 87389 HIV-1 AG W/HIV-1&-2 AB AG IA: CPT

## 2021-11-16 PROCEDURE — 83690 ASSAY OF LIPASE: CPT

## 2021-11-16 PROCEDURE — U0003 INFECTIOUS AGENT DETECTION BY NUCLEIC ACID (DNA OR RNA); SEVERE ACUTE RESPIRATORY SYNDROME CORONAVIRUS 2 (SARS-COV-2) (CORONAVIRUS DISEASE [COVID-19]), AMPLIFIED PROBE TECHNIQUE, MAKING USE OF HIGH THROUGHPUT TECHNOLOGIES AS DESCRIBED BY CMS-2020-01-R: HCPCS

## 2021-11-16 PROCEDURE — 80053 COMPREHEN METABOLIC PANEL: CPT

## 2021-11-16 PROCEDURE — 83880 ASSAY OF NATRIURETIC PEPTIDE: CPT

## 2021-11-16 PROCEDURE — 82550 ASSAY OF CK (CPK): CPT

## 2021-11-17 ENCOUNTER — HOSPITAL ENCOUNTER (INPATIENT)
Dept: HOSPITAL 92 - ERS | Age: 71
LOS: 2 days | Discharge: HOME | DRG: 868 | End: 2021-11-19
Attending: STUDENT IN AN ORGANIZED HEALTH CARE EDUCATION/TRAINING PROGRAM | Admitting: STUDENT IN AN ORGANIZED HEALTH CARE EDUCATION/TRAINING PROGRAM
Payer: MEDICARE

## 2021-11-17 VITALS — TEMPERATURE: 98.1 F | DIASTOLIC BLOOD PRESSURE: 82 MMHG | SYSTOLIC BLOOD PRESSURE: 168 MMHG

## 2021-11-17 VITALS — BODY MASS INDEX: 23.3 KG/M2

## 2021-11-17 DIAGNOSIS — I51.7: ICD-10-CM

## 2021-11-17 DIAGNOSIS — N17.9: ICD-10-CM

## 2021-11-17 DIAGNOSIS — Z90.710: ICD-10-CM

## 2021-11-17 DIAGNOSIS — J47.9: ICD-10-CM

## 2021-11-17 DIAGNOSIS — N18.30: ICD-10-CM

## 2021-11-17 DIAGNOSIS — F17.210: ICD-10-CM

## 2021-11-17 DIAGNOSIS — Z79.02: ICD-10-CM

## 2021-11-17 DIAGNOSIS — I12.9: ICD-10-CM

## 2021-11-17 DIAGNOSIS — I25.2: ICD-10-CM

## 2021-11-17 DIAGNOSIS — B44.1: Primary | ICD-10-CM

## 2021-11-17 DIAGNOSIS — Z79.899: ICD-10-CM

## 2021-11-17 DIAGNOSIS — J45.909: ICD-10-CM

## 2021-11-17 DIAGNOSIS — E78.5: ICD-10-CM

## 2021-11-17 DIAGNOSIS — Z95.1: ICD-10-CM

## 2021-11-17 DIAGNOSIS — Z28.21: ICD-10-CM

## 2021-11-17 DIAGNOSIS — I25.10: ICD-10-CM

## 2021-11-17 LAB
ALBUMIN SERPL BCG-MCNC: 4 G/DL (ref 3.4–4.8)
ALP SERPL-CCNC: 124 U/L (ref 40–110)
ALT SERPL W P-5'-P-CCNC: 15 U/L (ref 8–55)
ANION GAP SERPL CALC-SCNC: 11 MMOL/L (ref 10–20)
APTT PPP: 26.8 SEC (ref 22.9–36.1)
AST SERPL-CCNC: 14 U/L (ref 5–34)
BASOPHILS # BLD AUTO: 0 THOU/UL (ref 0–0.2)
BASOPHILS # BLD AUTO: 0.1 THOU/UL (ref 0–0.2)
BASOPHILS NFR BLD AUTO: 0 % (ref 0–1)
BASOPHILS NFR BLD AUTO: 0.3 % (ref 0–1)
BILIRUB SERPL-MCNC: 0.3 MG/DL (ref 0.2–1.2)
BUN SERPL-MCNC: 23 MG/DL (ref 9.8–20.1)
CALCIUM SERPL-MCNC: 9.4 MG/DL (ref 7.8–10.44)
CHLORIDE SERPL-SCNC: 108 MMOL/L (ref 98–107)
CO2 SERPL-SCNC: 24 MMOL/L (ref 23–31)
CREAT CL PREDICTED SERPL C-G-VRATE: 0 ML/MIN (ref 70–130)
EOSINOPHIL # BLD AUTO: 0 THOU/UL (ref 0–0.7)
EOSINOPHIL # BLD AUTO: 0 THOU/UL (ref 0–0.7)
EOSINOPHIL NFR BLD AUTO: 0 % (ref 0–10)
EOSINOPHIL NFR BLD AUTO: 0.1 % (ref 0–10)
GLOBULIN SER CALC-MCNC: 2.6 G/DL (ref 2.4–3.5)
GLUCOSE SERPL-MCNC: 163 MG/DL (ref 80–115)
HGB BLD-MCNC: 11.9 G/DL (ref 12–16)
HGB BLD-MCNC: 12.3 G/DL (ref 12–16)
INR PPP: 1
LYMPHOCYTES # BLD: 0.9 THOU/UL (ref 1.2–3.4)
LYMPHOCYTES # BLD: 1.1 THOU/UL (ref 1.2–3.4)
LYMPHOCYTES NFR BLD AUTO: 6.8 % (ref 21–51)
LYMPHOCYTES NFR BLD AUTO: 7.3 % (ref 21–51)
MCH RBC QN AUTO: 29.4 PG (ref 27–31)
MCH RBC QN AUTO: 30.1 PG (ref 27–31)
MCV RBC AUTO: 88.6 FL (ref 78–98)
MCV RBC AUTO: 88.6 FL (ref 78–98)
MONOCYTES # BLD AUTO: 0.1 THOU/UL (ref 0.11–0.59)
MONOCYTES # BLD AUTO: 0.7 THOU/UL (ref 0.11–0.59)
MONOCYTES NFR BLD AUTO: 0.8 % (ref 0–10)
MONOCYTES NFR BLD AUTO: 3.9 % (ref 0–10)
NEUTROPHILS # BLD AUTO: 10.9 THOU/UL (ref 1.4–6.5)
NEUTROPHILS # BLD AUTO: 14.8 THOU/UL (ref 1.4–6.5)
NEUTROPHILS NFR BLD AUTO: 88.9 % (ref 42–75)
NEUTROPHILS NFR BLD AUTO: 91.8 % (ref 42–75)
PLATELET # BLD AUTO: 166 THOU/UL (ref 130–400)
PLATELET # BLD AUTO: 179 THOU/UL (ref 130–400)
POTASSIUM SERPL-SCNC: 4.1 MMOL/L (ref 3.5–5.1)
PROTHROMBIN TIME: 13.7 SEC (ref 12–14.7)
RBC # BLD AUTO: 3.95 MILL/UL (ref 4.2–5.4)
RBC # BLD AUTO: 4.17 MILL/UL (ref 4.2–5.4)
SODIUM SERPL-SCNC: 139 MMOL/L (ref 136–145)
WBC # BLD AUTO: 11.9 THOU/UL (ref 4.8–10.8)
WBC # BLD AUTO: 16.7 THOU/UL (ref 4.8–10.8)

## 2021-11-17 PROCEDURE — 85025 COMPLETE CBC W/AUTO DIFF WBC: CPT

## 2021-11-17 PROCEDURE — 83605 ASSAY OF LACTIC ACID: CPT

## 2021-11-17 PROCEDURE — 80053 COMPREHEN METABOLIC PANEL: CPT

## 2021-11-17 PROCEDURE — 85610 PROTHROMBIN TIME: CPT

## 2021-11-17 PROCEDURE — 36415 COLL VENOUS BLD VENIPUNCTURE: CPT

## 2021-11-17 PROCEDURE — 86256 FLUORESCENT ANTIBODY TITER: CPT

## 2021-11-17 PROCEDURE — 80048 BASIC METABOLIC PNL TOTAL CA: CPT

## 2021-11-17 PROCEDURE — 71045 X-RAY EXAM CHEST 1 VIEW: CPT

## 2021-11-17 PROCEDURE — 96365 THER/PROPH/DIAG IV INF INIT: CPT

## 2021-11-17 PROCEDURE — 84145 PROCALCITONIN (PCT): CPT

## 2021-11-17 PROCEDURE — 82785 ASSAY OF IGE: CPT

## 2021-11-17 PROCEDURE — 96368 THER/DIAG CONCURRENT INF: CPT

## 2021-11-17 PROCEDURE — 87040 BLOOD CULTURE FOR BACTERIA: CPT

## 2021-11-17 PROCEDURE — 83516 IMMUNOASSAY NONANTIBODY: CPT

## 2021-11-17 PROCEDURE — 83520 IMMUNOASSAY QUANT NOS NONAB: CPT

## 2021-11-17 PROCEDURE — 71275 CT ANGIOGRAPHY CHEST: CPT

## 2021-11-17 PROCEDURE — 85730 THROMBOPLASTIN TIME PARTIAL: CPT

## 2021-11-17 PROCEDURE — 93306 TTE W/DOPPLER COMPLETE: CPT

## 2021-11-17 PROCEDURE — 94640 AIRWAY INHALATION TREATMENT: CPT

## 2021-11-17 PROCEDURE — 85652 RBC SED RATE AUTOMATED: CPT

## 2021-11-18 LAB
ANION GAP SERPL CALC-SCNC: 11 MMOL/L (ref 10–20)
BASOPHILS # BLD AUTO: 0.1 THOU/UL (ref 0–0.2)
BASOPHILS NFR BLD AUTO: 0.4 % (ref 0–1)
BUN SERPL-MCNC: 18 MG/DL (ref 9.8–20.1)
CALCIUM SERPL-MCNC: 9.3 MG/DL (ref 7.8–10.44)
CHLORIDE SERPL-SCNC: 110 MMOL/L (ref 98–107)
CO2 SERPL-SCNC: 24 MMOL/L (ref 23–31)
CREAT CL PREDICTED SERPL C-G-VRATE: 43 ML/MIN (ref 70–130)
EOSINOPHIL # BLD AUTO: 0.1 THOU/UL (ref 0–0.7)
EOSINOPHIL NFR BLD AUTO: 0.5 % (ref 0–10)
GLUCOSE SERPL-MCNC: 102 MG/DL (ref 80–115)
HGB BLD-MCNC: 11.4 G/DL (ref 12–16)
LYMPHOCYTES # BLD: 3.3 THOU/UL (ref 1.2–3.4)
LYMPHOCYTES NFR BLD AUTO: 23.9 % (ref 21–51)
MCH RBC QN AUTO: 29.2 PG (ref 27–31)
MCV RBC AUTO: 89.6 FL (ref 78–98)
MONOCYTES # BLD AUTO: 0.5 THOU/UL (ref 0.11–0.59)
MONOCYTES NFR BLD AUTO: 3.5 % (ref 0–10)
NEUTROPHILS # BLD AUTO: 9.9 THOU/UL (ref 1.4–6.5)
NEUTROPHILS NFR BLD AUTO: 71.7 % (ref 42–75)
PLATELET # BLD AUTO: 191 THOU/UL (ref 130–400)
POTASSIUM SERPL-SCNC: 3.7 MMOL/L (ref 3.5–5.1)
RBC # BLD AUTO: 3.89 MILL/UL (ref 4.2–5.4)
SODIUM SERPL-SCNC: 141 MMOL/L (ref 136–145)
WBC # BLD AUTO: 13.8 THOU/UL (ref 4.8–10.8)

## 2021-11-18 RX ADMIN — ONDANSETRON PRN MG: 2 INJECTION INTRAMUSCULAR; INTRAVENOUS at 20:23

## 2021-11-18 RX ADMIN — ONDANSETRON PRN MG: 2 INJECTION INTRAMUSCULAR; INTRAVENOUS at 05:50

## 2021-11-19 VITALS — DIASTOLIC BLOOD PRESSURE: 73 MMHG | TEMPERATURE: 98.1 F | SYSTOLIC BLOOD PRESSURE: 156 MMHG

## 2021-11-19 LAB
ANION GAP SERPL CALC-SCNC: 7 MMOL/L (ref 10–20)
BASOPHILS # BLD AUTO: 0 THOU/UL (ref 0–0.2)
BASOPHILS NFR BLD AUTO: 0.6 % (ref 0–1)
BUN SERPL-MCNC: 17 MG/DL (ref 9.8–20.1)
CALCIUM SERPL-MCNC: 8.9 MG/DL (ref 7.8–10.44)
CHLORIDE SERPL-SCNC: 109 MMOL/L (ref 98–107)
CO2 SERPL-SCNC: 28 MMOL/L (ref 23–31)
CREAT CL PREDICTED SERPL C-G-VRATE: 50 ML/MIN (ref 70–130)
EOSINOPHIL # BLD AUTO: 0.2 THOU/UL (ref 0–0.7)
EOSINOPHIL NFR BLD AUTO: 3 % (ref 0–10)
GLUCOSE SERPL-MCNC: 99 MG/DL (ref 80–115)
HGB BLD-MCNC: 9.7 G/DL (ref 12–16)
LYMPHOCYTES # BLD: 2.1 THOU/UL (ref 1.2–3.4)
LYMPHOCYTES NFR BLD AUTO: 32.1 % (ref 21–51)
MCH RBC QN AUTO: 29.9 PG (ref 27–31)
MCV RBC AUTO: 88.7 FL (ref 78–98)
MONOCYTES # BLD AUTO: 0.5 THOU/UL (ref 0.11–0.59)
MONOCYTES NFR BLD AUTO: 7 % (ref 0–10)
NEUTROPHILS # BLD AUTO: 3.8 THOU/UL (ref 1.4–6.5)
NEUTROPHILS NFR BLD AUTO: 57.3 % (ref 42–75)
PLATELET # BLD AUTO: 152 THOU/UL (ref 130–400)
POTASSIUM SERPL-SCNC: 4 MMOL/L (ref 3.5–5.1)
RBC # BLD AUTO: 3.23 MILL/UL (ref 4.2–5.4)
SODIUM SERPL-SCNC: 140 MMOL/L (ref 136–145)
WBC # BLD AUTO: 6.6 THOU/UL (ref 4.8–10.8)

## 2021-11-19 RX ADMIN — ONDANSETRON PRN MG: 2 INJECTION INTRAMUSCULAR; INTRAVENOUS at 06:19

## 2021-11-20 LAB
ELIA VACULITIS NEW METHOD: (no result)
PROTEINASE3 AB SER IA-ACNC: (no result) U/ML

## 2021-11-29 ENCOUNTER — HOSPITAL ENCOUNTER (INPATIENT)
Dept: HOSPITAL 92 - ERS | Age: 71
LOS: 5 days | Discharge: NURSING, REHAB OR LONG-TERM CARE | DRG: 65 | End: 2021-12-04
Attending: FAMILY MEDICINE | Admitting: FAMILY MEDICINE
Payer: MEDICARE

## 2021-11-29 VITALS — BODY MASS INDEX: 22.7 KG/M2

## 2021-11-29 DIAGNOSIS — D63.8: ICD-10-CM

## 2021-11-29 DIAGNOSIS — Z79.899: ICD-10-CM

## 2021-11-29 DIAGNOSIS — F17.210: ICD-10-CM

## 2021-11-29 DIAGNOSIS — I25.2: ICD-10-CM

## 2021-11-29 DIAGNOSIS — D50.9: ICD-10-CM

## 2021-11-29 DIAGNOSIS — K31.819: ICD-10-CM

## 2021-11-29 DIAGNOSIS — I10: ICD-10-CM

## 2021-11-29 DIAGNOSIS — Z95.1: ICD-10-CM

## 2021-11-29 DIAGNOSIS — Z20.822: ICD-10-CM

## 2021-11-29 DIAGNOSIS — J43.9: ICD-10-CM

## 2021-11-29 DIAGNOSIS — R04.2: ICD-10-CM

## 2021-11-29 DIAGNOSIS — E78.5: ICD-10-CM

## 2021-11-29 DIAGNOSIS — Z79.82: ICD-10-CM

## 2021-11-29 DIAGNOSIS — I25.10: ICD-10-CM

## 2021-11-29 DIAGNOSIS — Z87.01: ICD-10-CM

## 2021-11-29 DIAGNOSIS — Z90.710: ICD-10-CM

## 2021-11-29 DIAGNOSIS — N17.9: ICD-10-CM

## 2021-11-29 DIAGNOSIS — N39.0: ICD-10-CM

## 2021-11-29 DIAGNOSIS — I63.531: Primary | ICD-10-CM

## 2021-11-29 DIAGNOSIS — K25.9: ICD-10-CM

## 2021-11-29 DIAGNOSIS — J45.909: ICD-10-CM

## 2021-11-29 DIAGNOSIS — B44.9: ICD-10-CM

## 2021-11-29 DIAGNOSIS — R29.701: ICD-10-CM

## 2021-11-29 LAB
ALBUMIN SERPL BCG-MCNC: 3.9 G/DL (ref 3.4–4.8)
ALP SERPL-CCNC: 147 U/L (ref 40–110)
ALT SERPL W P-5'-P-CCNC: 15 U/L (ref 8–55)
ANION GAP SERPL CALC-SCNC: 13 MMOL/L (ref 10–20)
AST SERPL-CCNC: 12 U/L (ref 5–34)
BASOPHILS # BLD AUTO: 0 THOU/UL (ref 0–0.2)
BASOPHILS NFR BLD AUTO: 0.3 % (ref 0–1)
BILIRUB SERPL-MCNC: 0.3 MG/DL (ref 0.2–1.2)
BUN SERPL-MCNC: 6 MG/DL (ref 9.8–20.1)
CALCIUM SERPL-MCNC: 9.4 MG/DL (ref 7.8–10.44)
CHLORIDE SERPL-SCNC: 106 MMOL/L (ref 98–107)
CK MB SERPL-MCNC: 0.6 NG/ML (ref 0–6.6)
CO2 SERPL-SCNC: 24 MMOL/L (ref 23–31)
CREAT CL PREDICTED SERPL C-G-VRATE: 0 ML/MIN (ref 70–130)
EOSINOPHIL # BLD AUTO: 0.5 THOU/UL (ref 0–0.7)
EOSINOPHIL NFR BLD AUTO: 6.1 % (ref 0–10)
GLOBULIN SER CALC-MCNC: 2.9 G/DL (ref 2.4–3.5)
GLUCOSE SERPL-MCNC: 108 MG/DL (ref 80–115)
HGB BLD-MCNC: 9 G/DL (ref 12–16)
LYMPHOCYTES # BLD: 1.4 THOU/UL (ref 1.2–3.4)
LYMPHOCYTES NFR BLD AUTO: 16.5 % (ref 21–51)
MCH RBC QN AUTO: 28.8 PG (ref 27–31)
MCV RBC AUTO: 89.8 FL (ref 78–98)
MONOCYTES # BLD AUTO: 0.4 THOU/UL (ref 0.11–0.59)
MONOCYTES NFR BLD AUTO: 4.8 % (ref 0–10)
NEUTROPHILS # BLD AUTO: 6 THOU/UL (ref 1.4–6.5)
NEUTROPHILS NFR BLD AUTO: 72.3 % (ref 42–75)
PLATELET # BLD AUTO: 422 THOU/UL (ref 130–400)
POTASSIUM SERPL-SCNC: 4.1 MMOL/L (ref 3.5–5.1)
RBC # BLD AUTO: 3.11 MILL/UL (ref 4.2–5.4)
SODIUM SERPL-SCNC: 139 MMOL/L (ref 136–145)
TROPONIN I SERPL DL<=0.01 NG/ML-MCNC: 0.04 NG/ML (ref ?–0.03)
TROPONIN I SERPL DL<=0.01 NG/ML-MCNC: 0.05 NG/ML (ref ?–0.03)
WBC # BLD AUTO: 8.3 THOU/UL (ref 4.8–10.8)

## 2021-11-29 PROCEDURE — 86900 BLOOD TYPING SEROLOGIC ABO: CPT

## 2021-11-29 PROCEDURE — 84443 ASSAY THYROID STIM HORMONE: CPT

## 2021-11-29 PROCEDURE — 70551 MRI BRAIN STEM W/O DYE: CPT

## 2021-11-29 PROCEDURE — 70450 CT HEAD/BRAIN W/O DYE: CPT

## 2021-11-29 PROCEDURE — 83550 IRON BINDING TEST: CPT

## 2021-11-29 PROCEDURE — 85018 HEMOGLOBIN: CPT

## 2021-11-29 PROCEDURE — 93010 ELECTROCARDIOGRAM REPORT: CPT

## 2021-11-29 PROCEDURE — 71045 X-RAY EXAM CHEST 1 VIEW: CPT

## 2021-11-29 PROCEDURE — 85730 THROMBOPLASTIN TIME PARTIAL: CPT

## 2021-11-29 PROCEDURE — 87086 URINE CULTURE/COLONY COUNT: CPT

## 2021-11-29 PROCEDURE — 36415 COLL VENOUS BLD VENIPUNCTURE: CPT

## 2021-11-29 PROCEDURE — 86850 RBC ANTIBODY SCREEN: CPT

## 2021-11-29 PROCEDURE — 82607 VITAMIN B-12: CPT

## 2021-11-29 PROCEDURE — 83540 ASSAY OF IRON: CPT

## 2021-11-29 PROCEDURE — 85025 COMPLETE CBC W/AUTO DIFF WBC: CPT

## 2021-11-29 PROCEDURE — U0002 COVID-19 LAB TEST NON-CDC: HCPCS

## 2021-11-29 PROCEDURE — 82728 ASSAY OF FERRITIN: CPT

## 2021-11-29 PROCEDURE — 84484 ASSAY OF TROPONIN QUANT: CPT

## 2021-11-29 PROCEDURE — 82274 ASSAY TEST FOR BLOOD FECAL: CPT

## 2021-11-29 PROCEDURE — P9016 RBC LEUKOCYTES REDUCED: HCPCS

## 2021-11-29 PROCEDURE — 85610 PROTHROMBIN TIME: CPT

## 2021-11-29 PROCEDURE — 93005 ELECTROCARDIOGRAM TRACING: CPT

## 2021-11-29 PROCEDURE — 70496 CT ANGIOGRAPHY HEAD: CPT

## 2021-11-29 PROCEDURE — 82746 ASSAY OF FOLIC ACID SERUM: CPT

## 2021-11-29 PROCEDURE — 87040 BLOOD CULTURE FOR BACTERIA: CPT

## 2021-11-29 PROCEDURE — 82553 CREATINE MB FRACTION: CPT

## 2021-11-29 PROCEDURE — 85014 HEMATOCRIT: CPT

## 2021-11-29 PROCEDURE — 70498 CT ANGIOGRAPHY NECK: CPT

## 2021-11-29 PROCEDURE — 80061 LIPID PANEL: CPT

## 2021-11-29 PROCEDURE — 80053 COMPREHEN METABOLIC PANEL: CPT

## 2021-11-29 PROCEDURE — 36430 TRANSFUSION BLD/BLD COMPNT: CPT

## 2021-11-29 PROCEDURE — 83036 HEMOGLOBIN GLYCOSYLATED A1C: CPT

## 2021-11-29 PROCEDURE — 86901 BLOOD TYPING SEROLOGIC RH(D): CPT

## 2021-11-29 PROCEDURE — 80048 BASIC METABOLIC PNL TOTAL CA: CPT

## 2021-11-29 PROCEDURE — 88305 TISSUE EXAM BY PATHOLOGIST: CPT

## 2021-11-29 PROCEDURE — 81001 URINALYSIS AUTO W/SCOPE: CPT

## 2021-11-30 LAB
ALBUMIN SERPL BCG-MCNC: 3.6 G/DL (ref 3.4–4.8)
ALP SERPL-CCNC: 127 U/L (ref 40–110)
ALT SERPL W P-5'-P-CCNC: 11 U/L (ref 8–55)
ANION GAP SERPL CALC-SCNC: 9 MMOL/L (ref 10–20)
AST SERPL-CCNC: 10 U/L (ref 5–34)
BACTERIA UR QL AUTO: (no result) HPF
BASOPHILS # BLD AUTO: 0 THOU/UL (ref 0–0.2)
BASOPHILS NFR BLD AUTO: 0.3 % (ref 0–1)
BILIRUB SERPL-MCNC: 0.4 MG/DL (ref 0.2–1.2)
BUN SERPL-MCNC: 7 MG/DL (ref 9.8–20.1)
CALCIUM SERPL-MCNC: 9.5 MG/DL (ref 7.8–10.44)
CHD RISK SERPL-RTO: 6.2 (ref ?–4.5)
CHLORIDE SERPL-SCNC: 103 MMOL/L (ref 98–107)
CHOLEST SERPL-MCNC: 203 MG/DL
CO2 SERPL-SCNC: 27 MMOL/L (ref 23–31)
CREAT CL PREDICTED SERPL C-G-VRATE: 34 ML/MIN (ref 70–130)
EOSINOPHIL # BLD AUTO: 0.4 THOU/UL (ref 0–0.7)
EOSINOPHIL NFR BLD AUTO: 4.5 % (ref 0–10)
FERRITIN SERPL-MCNC: 63.18 NG/ML (ref 10–291)
GLOBULIN SER CALC-MCNC: 3.6 G/DL (ref 2.4–3.5)
GLUCOSE SERPL-MCNC: 113 MG/DL (ref 80–115)
HDLC SERPL-MCNC: 33 MG/DL
HGB BLD-MCNC: 9 G/DL (ref 12–16)
IRON SERPL-MCNC: 22 UG/DL (ref 50–170)
LDLC SERPL CALC-MCNC: 150 MG/DL
LEUKOCYTE ESTERASE UR QL STRIP.AUTO: 500 LEU/UL
LYMPHOCYTES # BLD: 1.3 THOU/UL (ref 1.2–3.4)
LYMPHOCYTES NFR BLD AUTO: 15.3 % (ref 21–51)
MCH RBC QN AUTO: 29.3 PG (ref 27–31)
MCV RBC AUTO: 90 FL (ref 78–98)
MONOCYTES # BLD AUTO: 0.4 THOU/UL (ref 0.11–0.59)
MONOCYTES NFR BLD AUTO: 5.2 % (ref 0–10)
NEUTROPHILS # BLD AUTO: 6.2 THOU/UL (ref 1.4–6.5)
NEUTROPHILS NFR BLD AUTO: 74.8 % (ref 42–75)
PLATELET # BLD AUTO: 400 THOU/UL (ref 130–400)
POTASSIUM SERPL-SCNC: 4 MMOL/L (ref 3.5–5.1)
RBC # BLD AUTO: 3.05 MILL/UL (ref 4.2–5.4)
RBC UR QL AUTO: (no result) HPF (ref 0–3)
SODIUM SERPL-SCNC: 135 MMOL/L (ref 136–145)
SP GR UR STRIP: 1.01 (ref 1–1.04)
TRIGL SERPL-MCNC: 99 MG/DL (ref ?–150)
UIBC SERPL-MCNC: 283 MCG/DL (ref 265–497)
VIT B12 SERPL-MCNC: 295 PG/ML (ref 211–911)
WBC # BLD AUTO: 8.3 THOU/UL (ref 4.8–10.8)
WBC UR QL AUTO: (no result) HPF (ref 0–3)

## 2021-11-30 RX ADMIN — ASPIRIN SCH MG: 81 TABLET ORAL at 08:00

## 2021-12-01 LAB
ANION GAP SERPL CALC-SCNC: 12 MMOL/L (ref 10–20)
APTT PPP: 37.3 SEC (ref 22.9–36.1)
BASOPHILS # BLD AUTO: 0 THOU/UL (ref 0–0.2)
BASOPHILS NFR BLD AUTO: 0.4 % (ref 0–1)
BUN SERPL-MCNC: 8 MG/DL (ref 9.8–20.1)
CALCIUM SERPL-MCNC: 9.5 MG/DL (ref 7.8–10.44)
CHLORIDE SERPL-SCNC: 103 MMOL/L (ref 98–107)
CO2 SERPL-SCNC: 25 MMOL/L (ref 23–31)
CREAT CL PREDICTED SERPL C-G-VRATE: 39 ML/MIN (ref 70–130)
EOSINOPHIL # BLD AUTO: 0.4 THOU/UL (ref 0–0.7)
EOSINOPHIL NFR BLD AUTO: 5.7 % (ref 0–10)
GLUCOSE SERPL-MCNC: 100 MG/DL (ref 80–115)
HGB BLD-MCNC: 8.4 G/DL (ref 12–16)
HGB BLD-MCNC: 8.5 G/DL (ref 12–16)
INR PPP: 1.2
LYMPHOCYTES # BLD: 1.7 THOU/UL (ref 1.2–3.4)
LYMPHOCYTES NFR BLD AUTO: 23.5 % (ref 21–51)
MCH RBC QN AUTO: 28.9 PG (ref 27–31)
MCV RBC AUTO: 90.1 FL (ref 78–98)
MONOCYTES # BLD AUTO: 0.4 THOU/UL (ref 0.11–0.59)
MONOCYTES NFR BLD AUTO: 6 % (ref 0–10)
NEUTROPHILS # BLD AUTO: 4.5 THOU/UL (ref 1.4–6.5)
NEUTROPHILS NFR BLD AUTO: 64.4 % (ref 42–75)
PLATELET # BLD AUTO: 349 THOU/UL (ref 130–400)
POTASSIUM SERPL-SCNC: 3.9 MMOL/L (ref 3.5–5.1)
PROTHROMBIN TIME: 15.6 SEC (ref 12–14.7)
RBC # BLD AUTO: 2.95 MILL/UL (ref 4.2–5.4)
SODIUM SERPL-SCNC: 136 MMOL/L (ref 136–145)
WBC # BLD AUTO: 7.1 THOU/UL (ref 4.8–10.8)

## 2021-12-01 RX ADMIN — ASPIRIN SCH MG: 81 TABLET ORAL at 09:34

## 2021-12-01 RX ADMIN — CEFTRIAXONE SCH MLS: 1 INJECTION, POWDER, FOR SOLUTION INTRAMUSCULAR; INTRAVENOUS at 10:11

## 2021-12-02 LAB
ANION GAP SERPL CALC-SCNC: 16 MMOL/L (ref 10–20)
BASOPHILS # BLD AUTO: 0.1 THOU/UL (ref 0–0.2)
BASOPHILS NFR BLD AUTO: 0.6 % (ref 0–1)
BUN SERPL-MCNC: 12 MG/DL (ref 9.8–20.1)
CALCIUM SERPL-MCNC: 9.3 MG/DL (ref 7.8–10.44)
CHLORIDE SERPL-SCNC: 104 MMOL/L (ref 98–107)
CO2 SERPL-SCNC: 21 MMOL/L (ref 23–31)
CREAT CL PREDICTED SERPL C-G-VRATE: 44 ML/MIN (ref 70–130)
EOSINOPHIL # BLD AUTO: 0.2 THOU/UL (ref 0–0.7)
EOSINOPHIL NFR BLD AUTO: 2.4 % (ref 0–10)
GLUCOSE SERPL-MCNC: 94 MG/DL (ref 80–115)
HGB BLD-MCNC: 7.9 G/DL (ref 12–16)
LYMPHOCYTES # BLD: 1.6 THOU/UL (ref 1.2–3.4)
LYMPHOCYTES NFR BLD AUTO: 17.5 % (ref 21–51)
MCH RBC QN AUTO: 28.1 PG (ref 27–31)
MCV RBC AUTO: 89.4 FL (ref 78–98)
MONOCYTES # BLD AUTO: 0.6 THOU/UL (ref 0.11–0.59)
MONOCYTES NFR BLD AUTO: 6.1 % (ref 0–10)
NEUTROPHILS # BLD AUTO: 6.6 THOU/UL (ref 1.4–6.5)
NEUTROPHILS NFR BLD AUTO: 73.4 % (ref 42–75)
PLATELET # BLD AUTO: 377 THOU/UL (ref 130–400)
POTASSIUM SERPL-SCNC: 3.9 MMOL/L (ref 3.5–5.1)
RBC # BLD AUTO: 2.79 MILL/UL (ref 4.2–5.4)
SODIUM SERPL-SCNC: 137 MMOL/L (ref 136–145)
WBC # BLD AUTO: 9 THOU/UL (ref 4.8–10.8)

## 2021-12-02 RX ADMIN — CEFTRIAXONE SCH MLS: 1 INJECTION, POWDER, FOR SOLUTION INTRAMUSCULAR; INTRAVENOUS at 09:10

## 2021-12-03 LAB
BASOPHILS # BLD AUTO: 0 THOU/UL (ref 0–0.2)
BASOPHILS NFR BLD AUTO: 0.4 % (ref 0–1)
EOSINOPHIL # BLD AUTO: 0.5 THOU/UL (ref 0–0.7)
EOSINOPHIL NFR BLD AUTO: 7.4 % (ref 0–10)
HGB BLD-MCNC: 7.4 G/DL (ref 12–16)
LYMPHOCYTES # BLD: 1.7 THOU/UL (ref 1.2–3.4)
LYMPHOCYTES NFR BLD AUTO: 25.4 % (ref 21–51)
MCH RBC QN AUTO: 28.4 PG (ref 27–31)
MCV RBC AUTO: 88 FL (ref 78–98)
MONOCYTES # BLD AUTO: 0.5 THOU/UL (ref 0.11–0.59)
MONOCYTES NFR BLD AUTO: 6.9 % (ref 0–10)
NEUTROPHILS # BLD AUTO: 3.9 THOU/UL (ref 1.4–6.5)
NEUTROPHILS NFR BLD AUTO: 59.9 % (ref 42–75)
PLATELET # BLD AUTO: 330 THOU/UL (ref 130–400)
RBC # BLD AUTO: 2.62 MILL/UL (ref 4.2–5.4)
WBC # BLD AUTO: 6.5 THOU/UL (ref 4.8–10.8)

## 2021-12-03 PROCEDURE — 0DB78ZX EXCISION OF STOMACH, PYLORUS, VIA NATURAL OR ARTIFICIAL OPENING ENDOSCOPIC, DIAGNOSTIC: ICD-10-PCS

## 2021-12-03 PROCEDURE — 30233N1 TRANSFUSION OF NONAUTOLOGOUS RED BLOOD CELLS INTO PERIPHERAL VEIN, PERCUTANEOUS APPROACH: ICD-10-PCS | Performed by: INTERNAL MEDICINE

## 2021-12-04 VITALS — DIASTOLIC BLOOD PRESSURE: 67 MMHG | TEMPERATURE: 98.7 F | SYSTOLIC BLOOD PRESSURE: 131 MMHG

## 2021-12-04 LAB — HGB BLD-MCNC: 9.3 G/DL (ref 12–16)

## 2022-05-17 ENCOUNTER — HOSPITAL ENCOUNTER (INPATIENT)
Dept: HOSPITAL 92 - ERS | Age: 72
LOS: 2 days | Discharge: HOME HEALTH | DRG: 65 | End: 2022-05-19
Attending: INTERNAL MEDICINE | Admitting: INTERNAL MEDICINE
Payer: MEDICARE

## 2022-05-17 VITALS — BODY MASS INDEX: 17.3 KG/M2

## 2022-05-17 DIAGNOSIS — E87.2: ICD-10-CM

## 2022-05-17 DIAGNOSIS — J45.909: ICD-10-CM

## 2022-05-17 DIAGNOSIS — N17.9: ICD-10-CM

## 2022-05-17 DIAGNOSIS — F17.210: ICD-10-CM

## 2022-05-17 DIAGNOSIS — R29.702: ICD-10-CM

## 2022-05-17 DIAGNOSIS — I25.2: ICD-10-CM

## 2022-05-17 DIAGNOSIS — Z79.899: ICD-10-CM

## 2022-05-17 DIAGNOSIS — Z95.1: ICD-10-CM

## 2022-05-17 DIAGNOSIS — E87.5: ICD-10-CM

## 2022-05-17 DIAGNOSIS — D75.1: ICD-10-CM

## 2022-05-17 DIAGNOSIS — I25.10: ICD-10-CM

## 2022-05-17 DIAGNOSIS — E78.5: ICD-10-CM

## 2022-05-17 DIAGNOSIS — I63.531: Primary | ICD-10-CM

## 2022-05-17 DIAGNOSIS — Z20.822: ICD-10-CM

## 2022-05-17 DIAGNOSIS — K21.9: ICD-10-CM

## 2022-05-17 DIAGNOSIS — Z91.14: ICD-10-CM

## 2022-05-17 DIAGNOSIS — Z95.5: ICD-10-CM

## 2022-05-17 DIAGNOSIS — I10: ICD-10-CM

## 2022-05-17 LAB
ALBUMIN SERPL BCG-MCNC: 4 G/DL (ref 3.4–4.8)
ALP SERPL-CCNC: 172 U/L (ref 40–110)
ALT SERPL W P-5'-P-CCNC: 15 U/L (ref 8–55)
ANION GAP SERPL CALC-SCNC: 16 MMOL/L (ref 10–20)
AST SERPL-CCNC: 20 U/L (ref 5–34)
BASOPHILS # BLD AUTO: 0 THOU/UL (ref 0–0.2)
BASOPHILS NFR BLD AUTO: 0.7 % (ref 0–1)
BILIRUB SERPL-MCNC: 0.5 MG/DL (ref 0.2–1.2)
BUN SERPL-MCNC: 7 MG/DL (ref 9.8–20.1)
CALCIUM SERPL-MCNC: 9.5 MG/DL (ref 7.8–10.44)
CHLORIDE SERPL-SCNC: 106 MMOL/L (ref 98–107)
CK MB SERPL-MCNC: 1 NG/ML (ref 0–6.6)
CO2 SERPL-SCNC: 22 MMOL/L (ref 23–31)
CREAT CL PREDICTED SERPL C-G-VRATE: 0 ML/MIN (ref 70–130)
EOSINOPHIL # BLD AUTO: 0.1 THOU/UL (ref 0–0.7)
EOSINOPHIL NFR BLD AUTO: 1.8 % (ref 0–10)
GLOBULIN SER CALC-MCNC: 3.4 G/DL (ref 2.4–3.5)
GLUCOSE SERPL-MCNC: 117 MG/DL (ref 80–115)
HGB BLD-MCNC: 17 G/DL (ref 12–16)
LIPASE SERPL-CCNC: 23 U/L (ref 8–78)
LYMPHOCYTES # BLD: 0.9 THOU/UL (ref 1.2–3.4)
LYMPHOCYTES NFR BLD AUTO: 21.6 % (ref 21–51)
MAGNESIUM SERPL-MCNC: 2.2 MG/DL (ref 1.6–2.6)
MCH RBC QN AUTO: 28.3 PG (ref 27–31)
MCV RBC AUTO: 91 FL (ref 78–98)
MONOCYTES # BLD AUTO: 0.2 THOU/UL (ref 0.11–0.59)
MONOCYTES NFR BLD AUTO: 4.8 % (ref 0–10)
NEUTROPHILS # BLD AUTO: 3 THOU/UL (ref 1.4–6.5)
NEUTROPHILS NFR BLD AUTO: 71.1 % (ref 42–75)
PLATELET # BLD AUTO: 118 THOU/UL (ref 130–400)
POTASSIUM SERPL-SCNC: 5.2 MMOL/L (ref 3.5–5.1)
RBC # BLD AUTO: 6.01 MILL/UL (ref 4.2–5.4)
SODIUM SERPL-SCNC: 139 MMOL/L (ref 136–145)
TROPONIN I SERPL DL<=0.01 NG/ML-MCNC: 0.06 NG/ML (ref ?–0.03)
TROPONIN I SERPL DL<=0.01 NG/ML-MCNC: 0.07 NG/ML (ref ?–0.03)
WBC # BLD AUTO: 4.3 THOU/UL (ref 4.8–10.8)

## 2022-05-17 PROCEDURE — 80048 BASIC METABOLIC PNL TOTAL CA: CPT

## 2022-05-17 PROCEDURE — U0005 INFEC AGEN DETEC AMPLI PROBE: HCPCS

## 2022-05-17 PROCEDURE — 81001 URINALYSIS AUTO W/SCOPE: CPT

## 2022-05-17 PROCEDURE — 82553 CREATINE MB FRACTION: CPT

## 2022-05-17 PROCEDURE — 85025 COMPLETE CBC W/AUTO DIFF WBC: CPT

## 2022-05-17 PROCEDURE — 70450 CT HEAD/BRAIN W/O DYE: CPT

## 2022-05-17 PROCEDURE — 36415 COLL VENOUS BLD VENIPUNCTURE: CPT

## 2022-05-17 PROCEDURE — 96374 THER/PROPH/DIAG INJ IV PUSH: CPT

## 2022-05-17 PROCEDURE — 94760 N-INVAS EAR/PLS OXIMETRY 1: CPT

## 2022-05-17 PROCEDURE — 84443 ASSAY THYROID STIM HORMONE: CPT

## 2022-05-17 PROCEDURE — 70551 MRI BRAIN STEM W/O DYE: CPT

## 2022-05-17 PROCEDURE — 83690 ASSAY OF LIPASE: CPT

## 2022-05-17 PROCEDURE — 70496 CT ANGIOGRAPHY HEAD: CPT

## 2022-05-17 PROCEDURE — 84484 ASSAY OF TROPONIN QUANT: CPT

## 2022-05-17 PROCEDURE — U0003 INFECTIOUS AGENT DETECTION BY NUCLEIC ACID (DNA OR RNA); SEVERE ACUTE RESPIRATORY SYNDROME CORONAVIRUS 2 (SARS-COV-2) (CORONAVIRUS DISEASE [COVID-19]), AMPLIFIED PROBE TECHNIQUE, MAKING USE OF HIGH THROUGHPUT TECHNOLOGIES AS DESCRIBED BY CMS-2020-01-R: HCPCS

## 2022-05-17 PROCEDURE — 83735 ASSAY OF MAGNESIUM: CPT

## 2022-05-17 PROCEDURE — 80053 COMPREHEN METABOLIC PANEL: CPT

## 2022-05-17 PROCEDURE — 83605 ASSAY OF LACTIC ACID: CPT

## 2022-05-17 PROCEDURE — 93005 ELECTROCARDIOGRAM TRACING: CPT

## 2022-05-17 PROCEDURE — 70498 CT ANGIOGRAPHY NECK: CPT

## 2022-05-17 PROCEDURE — 80061 LIPID PANEL: CPT

## 2022-05-18 LAB
ANION GAP SERPL CALC-SCNC: 18 MMOL/L (ref 10–20)
BACTERIA UR QL AUTO: (no result) HPF
BASOPHILS # BLD AUTO: 0 THOU/UL (ref 0–0.2)
BASOPHILS NFR BLD AUTO: 0.5 % (ref 0–1)
BUN SERPL-MCNC: 11 MG/DL (ref 9.8–20.1)
CALCIUM SERPL-MCNC: 9 MG/DL (ref 7.8–10.44)
CHD RISK SERPL-RTO: 2.7 (ref ?–4.5)
CHLORIDE SERPL-SCNC: 110 MMOL/L (ref 98–107)
CHOLEST SERPL-MCNC: 141 MG/DL
CO2 SERPL-SCNC: 11 MMOL/L (ref 23–31)
CREAT CL PREDICTED SERPL C-G-VRATE: 32 ML/MIN (ref 70–130)
EOSINOPHIL # BLD AUTO: 0.1 THOU/UL (ref 0–0.7)
EOSINOPHIL NFR BLD AUTO: 2.8 % (ref 0–10)
GLUCOSE SERPL-MCNC: 100 MG/DL (ref 80–115)
HDLC SERPL-MCNC: 52 MG/DL
HGB BLD-MCNC: 17.1 G/DL (ref 12–16)
LDLC SERPL CALC-MCNC: 74 MG/DL
LEUKOCYTE ESTERASE UR QL STRIP.AUTO: 500 LEU/UL
LYMPHOCYTES # BLD: 1.5 THOU/UL (ref 1.2–3.4)
LYMPHOCYTES NFR BLD AUTO: 31.3 % (ref 21–51)
MCH RBC QN AUTO: 29 PG (ref 27–31)
MCV RBC AUTO: 95.3 FL (ref 78–98)
MONOCYTES # BLD AUTO: 0.2 THOU/UL (ref 0.11–0.59)
MONOCYTES NFR BLD AUTO: 4.6 % (ref 0–10)
NEUTROPHILS # BLD AUTO: 2.9 THOU/UL (ref 1.4–6.5)
NEUTROPHILS NFR BLD AUTO: 60.8 % (ref 42–75)
PLATELET # BLD AUTO: 103 THOU/UL (ref 130–400)
POTASSIUM SERPL-SCNC: 4.7 MMOL/L (ref 3.5–5.1)
PROT UR STRIP.AUTO-MCNC: 20 MG/DL
RBC # BLD AUTO: 5.91 MILL/UL (ref 4.2–5.4)
RBC UR QL AUTO: (no result) HPF (ref 0–3)
SODIUM SERPL-SCNC: 134 MMOL/L (ref 136–145)
SP GR UR STRIP: 1.04 (ref 1–1.04)
TRIGL SERPL-MCNC: 76 MG/DL (ref ?–150)
WBC # BLD AUTO: 4.7 THOU/UL (ref 4.8–10.8)
WBC UR QL AUTO: (no result) HPF (ref 0–3)

## 2022-05-18 RX ADMIN — ASPIRIN SCH MG: 81 TABLET ORAL at 08:40

## 2022-05-19 VITALS — SYSTOLIC BLOOD PRESSURE: 136 MMHG | DIASTOLIC BLOOD PRESSURE: 85 MMHG

## 2022-05-19 VITALS — TEMPERATURE: 98.1 F

## 2022-05-19 LAB
ANION GAP SERPL CALC-SCNC: 13 MMOL/L (ref 10–20)
BUN SERPL-MCNC: 10 MG/DL (ref 9.8–20.1)
CALCIUM SERPL-MCNC: 9.1 MG/DL (ref 7.8–10.44)
CHLORIDE SERPL-SCNC: 109 MMOL/L (ref 98–107)
CO2 SERPL-SCNC: 19 MMOL/L (ref 23–31)
CREAT CL PREDICTED SERPL C-G-VRATE: 34 ML/MIN (ref 70–130)
GLUCOSE SERPL-MCNC: 92 MG/DL (ref 80–115)
POTASSIUM SERPL-SCNC: 3.8 MMOL/L (ref 3.5–5.1)
SODIUM SERPL-SCNC: 137 MMOL/L (ref 136–145)

## 2022-05-19 RX ADMIN — ASPIRIN SCH MG: 81 TABLET ORAL at 09:07
